# Patient Record
Sex: MALE | Race: WHITE | Employment: FULL TIME | URBAN - METROPOLITAN AREA
[De-identification: names, ages, dates, MRNs, and addresses within clinical notes are randomized per-mention and may not be internally consistent; named-entity substitution may affect disease eponyms.]

---

## 2017-02-02 ENCOUNTER — OFFICE VISIT (OUTPATIENT)
Dept: FAMILY MEDICINE CLINIC | Age: 47
End: 2017-02-02

## 2017-02-02 VITALS
SYSTOLIC BLOOD PRESSURE: 144 MMHG | BODY MASS INDEX: 36.43 KG/M2 | WEIGHT: 246 LBS | HEIGHT: 69 IN | DIASTOLIC BLOOD PRESSURE: 92 MMHG | HEART RATE: 93 BPM | OXYGEN SATURATION: 96 %

## 2017-02-02 DIAGNOSIS — R07.2 PRECORDIAL PAIN: Primary | ICD-10-CM

## 2017-02-02 DIAGNOSIS — I10 ESSENTIAL HYPERTENSION: ICD-10-CM

## 2017-02-02 DIAGNOSIS — R53.83 FATIGUE, UNSPECIFIED TYPE: ICD-10-CM

## 2017-02-02 LAB
A/G RATIO: 2 (ref 1.1–2.2)
ALBUMIN SERPL-MCNC: 5 G/DL (ref 3.4–5)
ALP BLD-CCNC: 88 U/L (ref 40–129)
ALT SERPL-CCNC: 88 U/L (ref 10–40)
ANION GAP SERPL CALCULATED.3IONS-SCNC: 17 MMOL/L (ref 3–16)
AST SERPL-CCNC: 51 U/L (ref 15–37)
BILIRUB SERPL-MCNC: 0.4 MG/DL (ref 0–1)
BUN BLDV-MCNC: 19 MG/DL (ref 7–20)
CALCIUM SERPL-MCNC: 10.1 MG/DL (ref 8.3–10.6)
CHLORIDE BLD-SCNC: 102 MMOL/L (ref 99–110)
CHOLESTEROL, TOTAL: 245 MG/DL (ref 0–199)
CO2: 27 MMOL/L (ref 21–32)
CREAT SERPL-MCNC: 0.8 MG/DL (ref 0.9–1.3)
GFR AFRICAN AMERICAN: >60
GFR NON-AFRICAN AMERICAN: >60
GLOBULIN: 2.5 G/DL
GLUCOSE BLD-MCNC: 88 MG/DL (ref 70–99)
HDLC SERPL-MCNC: 64 MG/DL (ref 40–60)
LDL CHOLESTEROL CALCULATED: 159 MG/DL
POTASSIUM SERPL-SCNC: 4 MMOL/L (ref 3.5–5.1)
SODIUM BLD-SCNC: 146 MMOL/L (ref 136–145)
TOTAL PROTEIN: 7.5 G/DL (ref 6.4–8.2)
TRIGL SERPL-MCNC: 110 MG/DL (ref 0–150)
TSH REFLEX: 1.89 UIU/ML (ref 0.27–4.2)
VLDLC SERPL CALC-MCNC: 22 MG/DL

## 2017-02-02 PROCEDURE — 99204 OFFICE O/P NEW MOD 45 MIN: CPT | Performed by: FAMILY MEDICINE

## 2017-02-02 PROCEDURE — 93000 ELECTROCARDIOGRAM COMPLETE: CPT | Performed by: FAMILY MEDICINE

## 2017-02-02 PROCEDURE — 36415 COLL VENOUS BLD VENIPUNCTURE: CPT | Performed by: FAMILY MEDICINE

## 2017-02-02 RX ORDER — METOPROLOL SUCCINATE 25 MG/1
25 TABLET, EXTENDED RELEASE ORAL DAILY
Qty: 30 TABLET | Refills: 3 | Status: SHIPPED | OUTPATIENT
Start: 2017-02-02 | End: 2017-06-01 | Stop reason: SDUPTHER

## 2017-02-21 RX ORDER — HYDROXYZINE 50 MG/1
25 TABLET, FILM COATED ORAL EVERY 6 HOURS PRN
Qty: 30 TABLET | Refills: 0 | Status: SHIPPED | OUTPATIENT
Start: 2017-02-21 | End: 2017-03-31 | Stop reason: SDUPTHER

## 2017-02-28 ENCOUNTER — TELEPHONE (OUTPATIENT)
Dept: FAMILY MEDICINE CLINIC | Age: 47
End: 2017-02-28

## 2017-03-03 ENCOUNTER — OFFICE VISIT (OUTPATIENT)
Dept: FAMILY MEDICINE CLINIC | Age: 47
End: 2017-03-03

## 2017-03-03 VITALS
HEIGHT: 69 IN | HEART RATE: 71 BPM | DIASTOLIC BLOOD PRESSURE: 84 MMHG | BODY MASS INDEX: 36.58 KG/M2 | WEIGHT: 247 LBS | SYSTOLIC BLOOD PRESSURE: 138 MMHG

## 2017-03-03 DIAGNOSIS — R53.83 FATIGUE, UNSPECIFIED TYPE: ICD-10-CM

## 2017-03-03 DIAGNOSIS — I10 ESSENTIAL HYPERTENSION: Primary | ICD-10-CM

## 2017-03-03 DIAGNOSIS — R74.8 ELEVATED LIVER ENZYMES: ICD-10-CM

## 2017-03-03 DIAGNOSIS — F41.9 ANXIETY: ICD-10-CM

## 2017-03-03 PROCEDURE — 99214 OFFICE O/P EST MOD 30 MIN: CPT | Performed by: FAMILY MEDICINE

## 2017-03-03 RX ORDER — PAROXETINE HYDROCHLORIDE 20 MG/1
20 TABLET, FILM COATED ORAL DAILY
Qty: 30 TABLET | Refills: 11 | Status: SHIPPED | OUTPATIENT
Start: 2017-03-03 | End: 2018-03-26 | Stop reason: SDUPTHER

## 2017-03-11 LAB
CATE U INT: ABNORMAL
CREATININE 24 HOUR URINE: 2327 MG/D (ref 1000–2500)
CREATININE URINE: 179 MG/DL
DOPAMINE (G CRT): 238 UG/G CRT (ref 0–250)
DOPAMINE 24 HOUR URINE: 554 UG/D (ref 77–324)
DOPAMINE, (UG/L): 426 UG/L
EPINEPHRINE (G CRT): 6 UG/G CRT (ref 0–20)
EPINEPHRINE 24 HOUR URINE: 14 UG/D (ref 1–7)
EPINEPHRINE, (UG/L): 11 UG/L
HOURS COLLECTED: 24 HR
METANEPHRINE INTREP URINE: ABNORMAL
METANEPHRINE UG/G CRE: 52 UG/G CRT (ref 0–300)
METANEPHRINE, UR-PER VOL: 93 UG/L
METANEPHRINES URINE: 121 UG/D (ref 62–207)
NOREPINEPH (G CRT): 71 UG/G CRT (ref 0–45)
NOREPINEPHRINE 24 HOUR URINE: 165 UG/D (ref 16–71)
NOREPINEPHRINE, (UG/L): 127 UG/L
NORMETANEPHRINE 24 HOUR URINE: 610 UG/D (ref 125–510)
NORMETANEPHRINE, (G/CRT): 262 UG/G CRT (ref 0–400)
NORMETANEPHRINES, NMOL/L: 469 UG/L
URINE TOTAL VOLUME: 1300 ML

## 2017-03-31 ENCOUNTER — OFFICE VISIT (OUTPATIENT)
Dept: FAMILY MEDICINE CLINIC | Age: 47
End: 2017-03-31

## 2017-03-31 VITALS
SYSTOLIC BLOOD PRESSURE: 139 MMHG | HEART RATE: 75 BPM | HEIGHT: 69 IN | WEIGHT: 247 LBS | DIASTOLIC BLOOD PRESSURE: 88 MMHG | RESPIRATION RATE: 20 BRPM | BODY MASS INDEX: 36.58 KG/M2

## 2017-03-31 DIAGNOSIS — I10 ESSENTIAL HYPERTENSION: Primary | ICD-10-CM

## 2017-03-31 DIAGNOSIS — E66.09 NON MORBID OBESITY DUE TO EXCESS CALORIES: ICD-10-CM

## 2017-03-31 DIAGNOSIS — F41.9 ANXIETY: ICD-10-CM

## 2017-03-31 DIAGNOSIS — R74.8 ELEVATED LIVER ENZYMES: ICD-10-CM

## 2017-03-31 PROCEDURE — 99214 OFFICE O/P EST MOD 30 MIN: CPT | Performed by: FAMILY MEDICINE

## 2017-03-31 RX ORDER — HYDROXYZINE 50 MG/1
25 TABLET, FILM COATED ORAL EVERY 6 HOURS PRN
Qty: 30 TABLET | Refills: 0 | Status: SHIPPED | OUTPATIENT
Start: 2017-03-31 | End: 2017-06-01 | Stop reason: SDUPTHER

## 2017-03-31 RX ORDER — HYDROXYZINE 50 MG/1
25 TABLET, FILM COATED ORAL EVERY 6 HOURS PRN
Qty: 30 TABLET | Refills: 0 | Status: SHIPPED | OUTPATIENT
Start: 2017-03-31 | End: 2017-03-31 | Stop reason: SDUPTHER

## 2017-03-31 RX ORDER — HYDROXYZINE 50 MG/1
TABLET, FILM COATED ORAL
Qty: 30 TABLET | Refills: 0 | Status: SHIPPED | OUTPATIENT
Start: 2017-03-31 | End: 2017-09-11

## 2017-03-31 ASSESSMENT — PATIENT HEALTH QUESTIONNAIRE - PHQ9
1. LITTLE INTEREST OR PLEASURE IN DOING THINGS: 0
SUM OF ALL RESPONSES TO PHQ QUESTIONS 1-9: 0
2. FEELING DOWN, DEPRESSED OR HOPELESS: 0
SUM OF ALL RESPONSES TO PHQ9 QUESTIONS 1 & 2: 0

## 2017-04-24 ENCOUNTER — OFFICE VISIT (OUTPATIENT)
Dept: FAMILY MEDICINE CLINIC | Age: 47
End: 2017-04-24

## 2017-04-24 VITALS
BODY MASS INDEX: 37.18 KG/M2 | HEIGHT: 69 IN | SYSTOLIC BLOOD PRESSURE: 124 MMHG | HEART RATE: 75 BPM | WEIGHT: 251 LBS | DIASTOLIC BLOOD PRESSURE: 88 MMHG

## 2017-04-24 DIAGNOSIS — F41.9 ANXIETY: Primary | ICD-10-CM

## 2017-04-24 DIAGNOSIS — I10 ESSENTIAL HYPERTENSION: ICD-10-CM

## 2017-04-24 DIAGNOSIS — R73.03 PREDIABETES: ICD-10-CM

## 2017-04-24 PROCEDURE — 99214 OFFICE O/P EST MOD 30 MIN: CPT | Performed by: FAMILY MEDICINE

## 2017-06-01 DIAGNOSIS — I10 ESSENTIAL HYPERTENSION: ICD-10-CM

## 2017-06-01 DIAGNOSIS — F41.9 ANXIETY: ICD-10-CM

## 2017-06-01 RX ORDER — METOPROLOL SUCCINATE 25 MG/1
TABLET, EXTENDED RELEASE ORAL
Qty: 90 TABLET | Refills: 3 | Status: SHIPPED | OUTPATIENT
Start: 2017-06-01 | End: 2018-07-26 | Stop reason: SDUPTHER

## 2017-06-01 RX ORDER — HYDROXYZINE 50 MG/1
TABLET, FILM COATED ORAL
Qty: 30 TABLET | Refills: 0 | Status: SHIPPED | OUTPATIENT
Start: 2017-06-01 | End: 2017-09-09 | Stop reason: SDUPTHER

## 2017-09-09 DIAGNOSIS — F41.9 ANXIETY: ICD-10-CM

## 2017-09-11 RX ORDER — HYDROXYZINE 50 MG/1
TABLET, FILM COATED ORAL
Qty: 30 TABLET | Refills: 3 | Status: SHIPPED | OUTPATIENT
Start: 2017-09-11 | End: 2018-03-26 | Stop reason: SDUPTHER

## 2018-03-26 DIAGNOSIS — F41.9 ANXIETY: ICD-10-CM

## 2018-03-26 RX ORDER — PAROXETINE HYDROCHLORIDE 20 MG/1
TABLET, FILM COATED ORAL
Qty: 30 TABLET | Refills: 8 | Status: SHIPPED | OUTPATIENT
Start: 2018-03-26 | End: 2019-03-07 | Stop reason: SDUPTHER

## 2018-03-26 RX ORDER — HYDROXYZINE 50 MG/1
TABLET, FILM COATED ORAL
Qty: 30 TABLET | Refills: 2 | Status: SHIPPED | OUTPATIENT
Start: 2018-03-26 | End: 2018-10-10 | Stop reason: SDUPTHER

## 2018-04-24 ENCOUNTER — OFFICE VISIT (OUTPATIENT)
Dept: FAMILY MEDICINE CLINIC | Age: 48
End: 2018-04-24

## 2018-04-24 VITALS
HEIGHT: 69 IN | DIASTOLIC BLOOD PRESSURE: 91 MMHG | SYSTOLIC BLOOD PRESSURE: 130 MMHG | WEIGHT: 235 LBS | HEART RATE: 97 BPM | BODY MASS INDEX: 34.8 KG/M2

## 2018-04-24 DIAGNOSIS — E66.09 NON MORBID OBESITY DUE TO EXCESS CALORIES: Primary | ICD-10-CM

## 2018-04-24 DIAGNOSIS — E11.9 DIABETES MELLITUS, NEW ONSET (HCC): ICD-10-CM

## 2018-04-24 DIAGNOSIS — I10 ESSENTIAL HYPERTENSION: ICD-10-CM

## 2018-04-24 PROBLEM — R73.03 PREDIABETES: Status: RESOLVED | Noted: 2017-04-24 | Resolved: 2018-04-24

## 2018-04-24 LAB
A/G RATIO: 2 (ref 1.1–2.2)
ALBUMIN SERPL-MCNC: 4.6 G/DL (ref 3.4–5)
ALP BLD-CCNC: 205 U/L (ref 40–129)
ALT SERPL-CCNC: 72 U/L (ref 10–40)
ANION GAP SERPL CALCULATED.3IONS-SCNC: 20 MMOL/L (ref 3–16)
AST SERPL-CCNC: <5 U/L (ref 15–37)
BILIRUB SERPL-MCNC: 0.5 MG/DL (ref 0–1)
BILIRUBIN, POC: NEGATIVE
BLOOD URINE, POC: NEGATIVE
BUN BLDV-MCNC: 15 MG/DL (ref 7–20)
CALCIUM SERPL-MCNC: 9.7 MG/DL (ref 8.3–10.6)
CHLORIDE BLD-SCNC: 87 MMOL/L (ref 99–110)
CHOLESTEROL, TOTAL: 340 MG/DL (ref 0–199)
CLARITY, POC: NEGATIVE
CO2: 26 MMOL/L (ref 21–32)
COLOR, POC: NEGATIVE
CREAT SERPL-MCNC: 0.6 MG/DL (ref 0.9–1.3)
CREATININE URINE: 41.8 MG/DL (ref 39–259)
GFR AFRICAN AMERICAN: >60
GFR NON-AFRICAN AMERICAN: >60
GLOBULIN: 2.3 G/DL
GLUCOSE BLD-MCNC: 492 MG/DL (ref 70–99)
GLUCOSE URINE, POC: ABNORMAL
HBA1C MFR BLD: 14 %
HDLC SERPL-MCNC: 32 MG/DL (ref 40–60)
KETONES, POC: NEGATIVE
LDL CHOLESTEROL CALCULATED: ABNORMAL MG/DL
LDL CHOLESTEROL DIRECT: 117 MG/DL
LEUKOCYTE EST, POC: NEGATIVE
MICROALBUMIN UR-MCNC: <1.2 MG/DL
MICROALBUMIN/CREAT UR-RTO: NORMAL MG/G (ref 0–30)
NITRITE, POC: NEGATIVE
PH, POC: 5.5
POTASSIUM SERPL-SCNC: 5.4 MMOL/L (ref 3.5–5.1)
PROTEIN, POC: NEGATIVE
SODIUM BLD-SCNC: 133 MMOL/L (ref 136–145)
SPECIFIC GRAVITY, POC: <=1.005
TOTAL PROTEIN: 6.9 G/DL (ref 6.4–8.2)
TRIGL SERPL-MCNC: 1780 MG/DL (ref 0–150)
UROBILINOGEN, POC: ABNORMAL
VLDLC SERPL CALC-MCNC: ABNORMAL MG/DL

## 2018-04-24 PROCEDURE — G8417 CALC BMI ABV UP PARAM F/U: HCPCS | Performed by: FAMILY MEDICINE

## 2018-04-24 PROCEDURE — 1036F TOBACCO NON-USER: CPT | Performed by: FAMILY MEDICINE

## 2018-04-24 PROCEDURE — 3046F HEMOGLOBIN A1C LEVEL >9.0%: CPT | Performed by: FAMILY MEDICINE

## 2018-04-24 PROCEDURE — 83036 HEMOGLOBIN GLYCOSYLATED A1C: CPT | Performed by: FAMILY MEDICINE

## 2018-04-24 PROCEDURE — G8427 DOCREV CUR MEDS BY ELIG CLIN: HCPCS | Performed by: FAMILY MEDICINE

## 2018-04-24 PROCEDURE — 99215 OFFICE O/P EST HI 40 MIN: CPT | Performed by: FAMILY MEDICINE

## 2018-04-24 PROCEDURE — 81002 URINALYSIS NONAUTO W/O SCOPE: CPT | Performed by: FAMILY MEDICINE

## 2018-04-24 PROCEDURE — 2022F DILAT RTA XM EVC RTNOPTHY: CPT | Performed by: FAMILY MEDICINE

## 2018-04-24 PROCEDURE — 36415 COLL VENOUS BLD VENIPUNCTURE: CPT | Performed by: FAMILY MEDICINE

## 2018-04-24 RX ORDER — GLIPIZIDE 5 MG/1
5 TABLET, FILM COATED, EXTENDED RELEASE ORAL DAILY
Qty: 90 TABLET | Refills: 3 | Status: SHIPPED | OUTPATIENT
Start: 2018-04-24 | End: 2018-09-07

## 2018-04-24 ASSESSMENT — ENCOUNTER SYMPTOMS
VOMITING: 0
EYE PAIN: 0
COUGH: 0
SORE THROAT: 0
SHORTNESS OF BREATH: 0
NAUSEA: 0
RHINORRHEA: 0
DIARRHEA: 0
RESPIRATORY NEGATIVE: 1
CONSTIPATION: 0
COLOR CHANGE: 0
GASTROINTESTINAL NEGATIVE: 1
ABDOMINAL PAIN: 0

## 2018-04-24 ASSESSMENT — PATIENT HEALTH QUESTIONNAIRE - PHQ9
2. FEELING DOWN, DEPRESSED OR HOPELESS: 0
SUM OF ALL RESPONSES TO PHQ9 QUESTIONS 1 & 2: 0
SUM OF ALL RESPONSES TO PHQ QUESTIONS 1-9: 0
1. LITTLE INTEREST OR PLEASURE IN DOING THINGS: 0

## 2018-04-27 LAB
GLUTAMIC ACID DECARB AB: <5 IU/ML (ref 0–5)
INSULIN A: <0.4 U/ML (ref 0–0.4)

## 2018-05-01 ENCOUNTER — TELEPHONE (OUTPATIENT)
Dept: ORTHOPEDIC SURGERY | Age: 48
End: 2018-05-01

## 2018-05-11 ENCOUNTER — TELEPHONE (OUTPATIENT)
Dept: FAMILY MEDICINE CLINIC | Age: 48
End: 2018-05-11

## 2018-05-11 ENCOUNTER — OFFICE VISIT (OUTPATIENT)
Dept: FAMILY MEDICINE CLINIC | Age: 48
End: 2018-05-11

## 2018-05-11 VITALS
DIASTOLIC BLOOD PRESSURE: 89 MMHG | HEIGHT: 69 IN | SYSTOLIC BLOOD PRESSURE: 131 MMHG | BODY MASS INDEX: 34.36 KG/M2 | HEART RATE: 64 BPM | WEIGHT: 232 LBS

## 2018-05-11 DIAGNOSIS — Z23 NEED FOR PROPHYLACTIC VACCINATION AGAINST STREPTOCOCCUS PNEUMONIAE (PNEUMOCOCCUS): ICD-10-CM

## 2018-05-11 DIAGNOSIS — E66.09 NON MORBID OBESITY DUE TO EXCESS CALORIES: Primary | ICD-10-CM

## 2018-05-11 DIAGNOSIS — E11.9 TYPE 2 DIABETES MELLITUS WITHOUT COMPLICATION, WITHOUT LONG-TERM CURRENT USE OF INSULIN (HCC): ICD-10-CM

## 2018-05-11 PROCEDURE — 90471 IMMUNIZATION ADMIN: CPT | Performed by: FAMILY MEDICINE

## 2018-05-11 PROCEDURE — 99214 OFFICE O/P EST MOD 30 MIN: CPT | Performed by: FAMILY MEDICINE

## 2018-05-11 PROCEDURE — 90732 PPSV23 VACC 2 YRS+ SUBQ/IM: CPT | Performed by: FAMILY MEDICINE

## 2018-05-11 PROCEDURE — G8417 CALC BMI ABV UP PARAM F/U: HCPCS | Performed by: FAMILY MEDICINE

## 2018-05-11 PROCEDURE — G8427 DOCREV CUR MEDS BY ELIG CLIN: HCPCS | Performed by: FAMILY MEDICINE

## 2018-05-11 PROCEDURE — 1036F TOBACCO NON-USER: CPT | Performed by: FAMILY MEDICINE

## 2018-05-11 PROCEDURE — 3046F HEMOGLOBIN A1C LEVEL >9.0%: CPT | Performed by: FAMILY MEDICINE

## 2018-05-11 PROCEDURE — 2022F DILAT RTA XM EVC RTNOPTHY: CPT | Performed by: FAMILY MEDICINE

## 2018-05-11 RX ORDER — LANCETS 30 GAUGE
EACH MISCELLANEOUS
Qty: 100 EACH | Refills: 3 | Status: SHIPPED | OUTPATIENT
Start: 2018-05-11 | End: 2021-06-15

## 2018-05-11 RX ORDER — BLOOD PRESSURE TEST KIT
1 KIT MISCELLANEOUS 2 TIMES DAILY
Qty: 1 EACH | Refills: 0 | Status: SHIPPED | OUTPATIENT
Start: 2018-05-11 | End: 2018-06-10 | Stop reason: SDUPTHER

## 2018-05-11 RX ORDER — GLUCOSAMINE HCL/CHONDROITIN SU 500-400 MG
1 CAPSULE ORAL 2 TIMES DAILY
Qty: 100 STRIP | Refills: 3 | Status: SHIPPED | OUTPATIENT
Start: 2018-05-11 | End: 2020-08-31 | Stop reason: SDUPTHER

## 2018-05-11 ASSESSMENT — ENCOUNTER SYMPTOMS
SHORTNESS OF BREATH: 0
DIARRHEA: 0
CONSTIPATION: 0
NAUSEA: 0
ABDOMINAL PAIN: 0
VOMITING: 0
COUGH: 0

## 2018-06-08 ENCOUNTER — OFFICE VISIT (OUTPATIENT)
Dept: FAMILY MEDICINE CLINIC | Age: 48
End: 2018-06-08

## 2018-06-08 VITALS
SYSTOLIC BLOOD PRESSURE: 127 MMHG | BODY MASS INDEX: 34.96 KG/M2 | HEIGHT: 69 IN | WEIGHT: 236 LBS | HEART RATE: 76 BPM | DIASTOLIC BLOOD PRESSURE: 81 MMHG

## 2018-06-08 DIAGNOSIS — E66.09 NON MORBID OBESITY DUE TO EXCESS CALORIES: ICD-10-CM

## 2018-06-08 DIAGNOSIS — E78.1 HYPERTRIGLYCERIDEMIA: ICD-10-CM

## 2018-06-08 DIAGNOSIS — E11.9 TYPE 2 DIABETES MELLITUS WITHOUT COMPLICATION, WITHOUT LONG-TERM CURRENT USE OF INSULIN (HCC): Primary | ICD-10-CM

## 2018-06-08 DIAGNOSIS — I10 ESSENTIAL HYPERTENSION: ICD-10-CM

## 2018-06-08 LAB — HBA1C MFR BLD: 8.5 %

## 2018-06-08 PROCEDURE — G8417 CALC BMI ABV UP PARAM F/U: HCPCS | Performed by: FAMILY MEDICINE

## 2018-06-08 PROCEDURE — 2022F DILAT RTA XM EVC RTNOPTHY: CPT | Performed by: FAMILY MEDICINE

## 2018-06-08 PROCEDURE — G8427 DOCREV CUR MEDS BY ELIG CLIN: HCPCS | Performed by: FAMILY MEDICINE

## 2018-06-08 PROCEDURE — 1036F TOBACCO NON-USER: CPT | Performed by: FAMILY MEDICINE

## 2018-06-08 PROCEDURE — 83036 HEMOGLOBIN GLYCOSYLATED A1C: CPT | Performed by: FAMILY MEDICINE

## 2018-06-08 PROCEDURE — 3045F PR MOST RECENT HEMOGLOBIN A1C LEVEL 7.0-9.0%: CPT | Performed by: FAMILY MEDICINE

## 2018-06-08 PROCEDURE — 99214 OFFICE O/P EST MOD 30 MIN: CPT | Performed by: FAMILY MEDICINE

## 2018-06-08 RX ORDER — ICOSAPENT ETHYL 1000 MG/1
2 CAPSULE ORAL 2 TIMES DAILY
Qty: 120 CAPSULE | Refills: 3 | Status: SHIPPED | OUTPATIENT
Start: 2018-06-08 | End: 2018-06-08

## 2018-06-08 RX ORDER — ICOSAPENT ETHYL 1000 MG/1
2 CAPSULE ORAL 2 TIMES DAILY
Qty: 120 CAPSULE | Refills: 5 | Status: SHIPPED | OUTPATIENT
Start: 2018-06-08 | End: 2018-12-04

## 2018-06-08 ASSESSMENT — ENCOUNTER SYMPTOMS
DIARRHEA: 0
SHORTNESS OF BREATH: 0
GASTROINTESTINAL NEGATIVE: 1
ABDOMINAL PAIN: 0
VOMITING: 0
CONSTIPATION: 0
WHEEZING: 0
COUGH: 0
NAUSEA: 0
RESPIRATORY NEGATIVE: 1

## 2018-06-10 DIAGNOSIS — E11.9 TYPE 2 DIABETES MELLITUS WITHOUT COMPLICATION, WITHOUT LONG-TERM CURRENT USE OF INSULIN (HCC): ICD-10-CM

## 2018-06-11 RX ORDER — ISOPROPYL ALCOHOL 0.75 G/1
SWAB TOPICAL
Qty: 100 EACH | Refills: 3 | Status: SHIPPED | OUTPATIENT
Start: 2018-06-11 | End: 2020-08-31 | Stop reason: SDUPTHER

## 2018-07-26 DIAGNOSIS — I10 ESSENTIAL HYPERTENSION: ICD-10-CM

## 2018-07-26 RX ORDER — METOPROLOL SUCCINATE 25 MG/1
TABLET, EXTENDED RELEASE ORAL
Qty: 90 TABLET | Refills: 3 | Status: SHIPPED | OUTPATIENT
Start: 2018-07-26 | End: 2019-06-07 | Stop reason: SDUPTHER

## 2018-08-29 ENCOUNTER — TELEPHONE (OUTPATIENT)
Dept: FAMILY MEDICINE CLINIC | Age: 48
End: 2018-08-29

## 2018-08-29 NOTE — TELEPHONE ENCOUNTER
Attempted to contact patient on 8/29/2018. (Sept 7)    Result: no answer at the patient's number, no voicemail available. Pre-Visit planning not completed.

## 2018-09-07 ENCOUNTER — OFFICE VISIT (OUTPATIENT)
Dept: FAMILY MEDICINE CLINIC | Age: 48
End: 2018-09-07

## 2018-09-07 VITALS
WEIGHT: 247 LBS | HEIGHT: 69 IN | HEART RATE: 82 BPM | BODY MASS INDEX: 36.58 KG/M2 | DIASTOLIC BLOOD PRESSURE: 86 MMHG | SYSTOLIC BLOOD PRESSURE: 134 MMHG

## 2018-09-07 DIAGNOSIS — Z23 NEED FOR VACCINATION: ICD-10-CM

## 2018-09-07 DIAGNOSIS — F41.9 ANXIETY: ICD-10-CM

## 2018-09-07 DIAGNOSIS — I10 ESSENTIAL HYPERTENSION: ICD-10-CM

## 2018-09-07 DIAGNOSIS — E11.9 TYPE 2 DIABETES MELLITUS WITHOUT COMPLICATION, WITHOUT LONG-TERM CURRENT USE OF INSULIN (HCC): Primary | ICD-10-CM

## 2018-09-07 DIAGNOSIS — E78.1 HYPERTRIGLYCERIDEMIA: ICD-10-CM

## 2018-09-07 DIAGNOSIS — E66.09 NON MORBID OBESITY DUE TO EXCESS CALORIES: ICD-10-CM

## 2018-09-07 LAB
A/G RATIO: 2 (ref 1.1–2.2)
ALBUMIN SERPL-MCNC: 4.9 G/DL (ref 3.4–5)
ALP BLD-CCNC: 85 U/L (ref 40–129)
ALT SERPL-CCNC: 42 U/L (ref 10–40)
ANION GAP SERPL CALCULATED.3IONS-SCNC: 15 MMOL/L (ref 3–16)
AST SERPL-CCNC: 45 U/L (ref 15–37)
BILIRUB SERPL-MCNC: 0.3 MG/DL (ref 0–1)
BUN BLDV-MCNC: 19 MG/DL (ref 7–20)
CALCIUM SERPL-MCNC: 10.1 MG/DL (ref 8.3–10.6)
CHLORIDE BLD-SCNC: 101 MMOL/L (ref 99–110)
CHOLESTEROL, TOTAL: 235 MG/DL (ref 0–199)
CO2: 27 MMOL/L (ref 21–32)
CREAT SERPL-MCNC: 0.9 MG/DL (ref 0.9–1.3)
GFR AFRICAN AMERICAN: >60
GFR NON-AFRICAN AMERICAN: >60
GLOBULIN: 2.4 G/DL
GLUCOSE BLD-MCNC: 146 MG/DL (ref 70–99)
HBA1C MFR BLD: 5.9 %
HDLC SERPL-MCNC: 50 MG/DL (ref 40–60)
LDL CHOLESTEROL CALCULATED: 134 MG/DL
POTASSIUM SERPL-SCNC: 3.8 MMOL/L (ref 3.5–5.1)
SODIUM BLD-SCNC: 143 MMOL/L (ref 136–145)
TOTAL PROTEIN: 7.3 G/DL (ref 6.4–8.2)
TRIGL SERPL-MCNC: 254 MG/DL (ref 0–150)
VLDLC SERPL CALC-MCNC: 51 MG/DL

## 2018-09-07 PROCEDURE — 99215 OFFICE O/P EST HI 40 MIN: CPT | Performed by: FAMILY MEDICINE

## 2018-09-07 PROCEDURE — 83036 HEMOGLOBIN GLYCOSYLATED A1C: CPT | Performed by: FAMILY MEDICINE

## 2018-09-07 PROCEDURE — 1036F TOBACCO NON-USER: CPT | Performed by: FAMILY MEDICINE

## 2018-09-07 PROCEDURE — 2022F DILAT RTA XM EVC RTNOPTHY: CPT | Performed by: FAMILY MEDICINE

## 2018-09-07 PROCEDURE — G8427 DOCREV CUR MEDS BY ELIG CLIN: HCPCS | Performed by: FAMILY MEDICINE

## 2018-09-07 PROCEDURE — 3044F HG A1C LEVEL LT 7.0%: CPT | Performed by: FAMILY MEDICINE

## 2018-09-07 PROCEDURE — 36415 COLL VENOUS BLD VENIPUNCTURE: CPT | Performed by: FAMILY MEDICINE

## 2018-09-07 PROCEDURE — G8417 CALC BMI ABV UP PARAM F/U: HCPCS | Performed by: FAMILY MEDICINE

## 2018-09-07 ASSESSMENT — ENCOUNTER SYMPTOMS
VOMITING: 0
SHORTNESS OF BREATH: 0
COUGH: 0
DIARRHEA: 0
NAUSEA: 0
COLOR CHANGE: 0
RHINORRHEA: 0
CONSTIPATION: 0
EYE PAIN: 0
SORE THROAT: 0
ABDOMINAL PAIN: 0

## 2018-09-07 NOTE — PROGRESS NOTES
Chief Complaint   Patient presents with    Diabetes    Hyperlipidemia    Hypertension    Obesity         HPI:  Denny Zabala is a 57 y.o. (: 1970) here today   for review of multiple medical problems. He is compliant with his diabetes medications  without diaphoresis, , sweating, , anxiety, , tremor, , diarrhea,  and hypoglycemia   He is  Checking blood sugars   and which are running 105-180. He has cut out his sugary drinks and his BS control is dramatically improved. He is compliant with his cholesterol medication without myalgia and abdominal pain      He is compliant with his blood pressure medications  without Lightheadedness, Urinary Frequency, Edema and Sedation  He is not checking Home Blood Pressures. Obesity:  Patient cites that he has cut out all liquid carb's from his diet and trying to be more mindful of what he eats. He reports his anxiety is much improved and taking and tolerating the Paxil. Review of Systems   Constitutional: Negative for chills and fever. HENT: Negative for ear pain, rhinorrhea and sore throat. Eyes: Negative for pain and visual disturbance. Respiratory: Negative for cough and shortness of breath. Cardiovascular: Negative for chest pain and palpitations. Gastrointestinal: Negative for abdominal pain, constipation, diarrhea, nausea and vomiting. Genitourinary: Negative for dysuria and frequency. Musculoskeletal: Negative for joint swelling and myalgias. Skin: Negative for color change and rash. Neurological: Negative for weakness, numbness and headaches. Hematological: Negative for adenopathy. Does not bruise/bleed easily. Psychiatric/Behavioral: Negative for dysphoric mood, self-injury and suicidal ideas. The patient is not nervous/anxious.         Past Medical History:   Diagnosis Date    Anxiety 3/3/2017    Hypertension     Hypertriglyceridemia 2018    [facility]  normal.   Skin: Skin is warm and dry. No rash noted. No erythema. Psychiatric: He has a normal mood and affect. His speech is normal and behavior is normal.           Hemoglobin A1C (%)   Date Value   06/08/2018 8.5 (A)     Microalbumin, Random Urine (mg/dL)   Date Value   04/24/2018 <1.20     LDL Calculated (mg/dL)   Date Value   04/24/2018 see below       ASSESSMENT/PLAN:    1. Type 2 diabetes mellitus without complication, without long-term current use of insulin ([facility])  a1c was 5.9 today. Dramatically improved. Will dc glipizide and recheck 3 months. With a1c continue other meds. - Comprehensive Metabolic Panel  - POCT glycosylated hemoglobin (Hb A1C)    2. Hypertriglyceridemia  Will check lipids with dietary interventions and meds. Recheck 3 motnhs. - Lipid Panel  - Comprehensive Metabolic Panel    3. Non morbid obesity due to excess calories  Worse: pateitn is muscular though, will follow. 4. Essential hypertension  Controlled: Appears stable. We will continue current management and monitor for adverse reaction and disease progression. Follow-up as noted below    - Comprehensive Metabolic Panel    5. Need for vaccination  Declined flu shot. 6. Anxiety  Stable on meds, continue and follow. RTC 3 months. Scribe attestation:  Irene Henriquez am scribing for and in the presence of Karissa Merlos MD. Electronically signed by Honey Waters on 9/7/2018 at 8:13 AM            Provider attestation: Tisha Holliday MD, personally performed the services scribed by the user listed above in my presence, and it is both accurate and complete. I agree with the ROS and Past Histories independently gathered by the clinical support staff and the remaining scribed note accurately describes my personal service to the patient.     [facility]    9/7/2018  8:37 AM

## 2018-10-10 DIAGNOSIS — F41.9 ANXIETY: ICD-10-CM

## 2018-10-10 DIAGNOSIS — E11.9 DIABETES MELLITUS, NEW ONSET (HCC): ICD-10-CM

## 2018-10-10 RX ORDER — EMPAGLIFLOZIN 25 MG/1
TABLET, FILM COATED ORAL
Qty: 90 TABLET | Refills: 3 | Status: SHIPPED | OUTPATIENT
Start: 2018-10-10 | End: 2019-10-13 | Stop reason: SDUPTHER

## 2018-10-10 RX ORDER — HYDROXYZINE 50 MG/1
TABLET, FILM COATED ORAL
Qty: 30 TABLET | Refills: 1 | Status: SHIPPED | OUTPATIENT
Start: 2018-10-10 | End: 2019-01-03 | Stop reason: SDUPTHER

## 2018-12-04 ENCOUNTER — TELEPHONE (OUTPATIENT)
Dept: FAMILY MEDICINE CLINIC | Age: 48
End: 2018-12-04

## 2019-01-03 DIAGNOSIS — F41.9 ANXIETY: ICD-10-CM

## 2019-01-03 RX ORDER — HYDROXYZINE 50 MG/1
TABLET, FILM COATED ORAL
Qty: 30 TABLET | Refills: 0 | Status: SHIPPED | OUTPATIENT
Start: 2019-01-03 | End: 2019-02-04 | Stop reason: SDUPTHER

## 2019-01-04 ENCOUNTER — OFFICE VISIT (OUTPATIENT)
Dept: PRIMARY CARE CLINIC | Age: 49
End: 2019-01-04
Payer: COMMERCIAL

## 2019-01-04 VITALS
SYSTOLIC BLOOD PRESSURE: 148 MMHG | OXYGEN SATURATION: 93 % | WEIGHT: 245 LBS | BODY MASS INDEX: 36.29 KG/M2 | DIASTOLIC BLOOD PRESSURE: 88 MMHG | HEART RATE: 97 BPM | HEIGHT: 69 IN

## 2019-01-04 DIAGNOSIS — E78.1 HYPERTRIGLYCERIDEMIA: ICD-10-CM

## 2019-01-04 DIAGNOSIS — E78.00 HYPERCHOLESTEROLEMIA: ICD-10-CM

## 2019-01-04 DIAGNOSIS — E11.9 TYPE 2 DIABETES MELLITUS WITHOUT COMPLICATION, WITHOUT LONG-TERM CURRENT USE OF INSULIN (HCC): Primary | ICD-10-CM

## 2019-01-04 DIAGNOSIS — F41.9 ANXIETY: ICD-10-CM

## 2019-01-04 DIAGNOSIS — E66.09 NON MORBID OBESITY DUE TO EXCESS CALORIES: ICD-10-CM

## 2019-01-04 DIAGNOSIS — I10 ESSENTIAL HYPERTENSION: ICD-10-CM

## 2019-01-04 LAB
A/G RATIO: 1.7 (ref 1.1–2.2)
ALBUMIN SERPL-MCNC: 4.7 G/DL (ref 3.4–5)
ALP BLD-CCNC: 99 U/L (ref 40–129)
ALT SERPL-CCNC: 45 U/L (ref 10–40)
ANION GAP SERPL CALCULATED.3IONS-SCNC: 14 MMOL/L (ref 3–16)
AST SERPL-CCNC: 33 U/L (ref 15–37)
BILIRUB SERPL-MCNC: 0.3 MG/DL (ref 0–1)
BUN BLDV-MCNC: 20 MG/DL (ref 7–20)
CALCIUM SERPL-MCNC: 10.4 MG/DL (ref 8.3–10.6)
CHLORIDE BLD-SCNC: 100 MMOL/L (ref 99–110)
CHOLESTEROL, TOTAL: 291 MG/DL (ref 0–199)
CO2: 31 MMOL/L (ref 21–32)
CREAT SERPL-MCNC: 0.8 MG/DL (ref 0.9–1.3)
GFR AFRICAN AMERICAN: >60
GFR NON-AFRICAN AMERICAN: >60
GLOBULIN: 2.8 G/DL
GLUCOSE BLD-MCNC: 123 MG/DL (ref 70–99)
HBA1C MFR BLD: 6.4 %
HDLC SERPL-MCNC: 44 MG/DL (ref 40–60)
LDL CHOLESTEROL CALCULATED: ABNORMAL MG/DL
LDL CHOLESTEROL DIRECT: 207 MG/DL
POTASSIUM SERPL-SCNC: 4 MMOL/L (ref 3.5–5.1)
SODIUM BLD-SCNC: 145 MMOL/L (ref 136–145)
TOTAL PROTEIN: 7.5 G/DL (ref 6.4–8.2)
TRIGL SERPL-MCNC: 441 MG/DL (ref 0–150)
VLDLC SERPL CALC-MCNC: ABNORMAL MG/DL

## 2019-01-04 PROCEDURE — 99214 OFFICE O/P EST MOD 30 MIN: CPT | Performed by: FAMILY MEDICINE

## 2019-01-04 PROCEDURE — 1036F TOBACCO NON-USER: CPT | Performed by: FAMILY MEDICINE

## 2019-01-04 PROCEDURE — G8484 FLU IMMUNIZE NO ADMIN: HCPCS | Performed by: FAMILY MEDICINE

## 2019-01-04 PROCEDURE — G8427 DOCREV CUR MEDS BY ELIG CLIN: HCPCS | Performed by: FAMILY MEDICINE

## 2019-01-04 PROCEDURE — 83036 HEMOGLOBIN GLYCOSYLATED A1C: CPT | Performed by: FAMILY MEDICINE

## 2019-01-04 PROCEDURE — G8417 CALC BMI ABV UP PARAM F/U: HCPCS | Performed by: FAMILY MEDICINE

## 2019-01-04 PROCEDURE — 2022F DILAT RTA XM EVC RTNOPTHY: CPT | Performed by: FAMILY MEDICINE

## 2019-01-04 PROCEDURE — 3046F HEMOGLOBIN A1C LEVEL >9.0%: CPT | Performed by: FAMILY MEDICINE

## 2019-01-04 RX ORDER — ATORVASTATIN CALCIUM 40 MG/1
40 TABLET, FILM COATED ORAL DAILY
Qty: 30 TABLET | Refills: 5 | Status: SHIPPED | OUTPATIENT
Start: 2019-01-04 | End: 2019-06-03 | Stop reason: SDUPTHER

## 2019-01-04 RX ORDER — ATORVASTATIN CALCIUM 20 MG/1
20 TABLET, FILM COATED ORAL DAILY
Qty: 30 TABLET | Refills: 11 | Status: SHIPPED | OUTPATIENT
Start: 2019-01-04 | End: 2019-01-04

## 2019-01-04 RX ORDER — HYDROCHLOROTHIAZIDE 12.5 MG/1
12.5 CAPSULE, GELATIN COATED ORAL DAILY
Qty: 30 CAPSULE | Refills: 11 | Status: SHIPPED | OUTPATIENT
Start: 2019-01-04 | End: 2019-10-13 | Stop reason: SDUPTHER

## 2019-01-04 ASSESSMENT — ENCOUNTER SYMPTOMS
DIARRHEA: 0
COUGH: 0
RHINORRHEA: 0
CONSTIPATION: 0
ABDOMINAL PAIN: 0
VOMITING: 0
NAUSEA: 0
EYE PAIN: 0
COLOR CHANGE: 0
SORE THROAT: 0
SHORTNESS OF BREATH: 0

## 2019-02-04 DIAGNOSIS — F41.9 ANXIETY: ICD-10-CM

## 2019-02-05 RX ORDER — HYDROXYZINE 50 MG/1
TABLET, FILM COATED ORAL
Qty: 30 TABLET | Refills: 0 | Status: SHIPPED | OUTPATIENT
Start: 2019-02-05 | End: 2019-05-12 | Stop reason: SDUPTHER

## 2019-03-07 DIAGNOSIS — F41.9 ANXIETY: ICD-10-CM

## 2019-03-08 RX ORDER — PAROXETINE HYDROCHLORIDE 20 MG/1
TABLET, FILM COATED ORAL
Qty: 30 TABLET | Refills: 7 | Status: SHIPPED | OUTPATIENT
Start: 2019-03-08 | End: 2019-09-28 | Stop reason: SDUPTHER

## 2019-03-22 ENCOUNTER — TELEPHONE (OUTPATIENT)
Dept: PRIMARY CARE CLINIC | Age: 49
End: 2019-03-22

## 2019-03-22 DIAGNOSIS — E11.9 TYPE 2 DIABETES MELLITUS WITHOUT COMPLICATION, WITHOUT LONG-TERM CURRENT USE OF INSULIN (HCC): Primary | ICD-10-CM

## 2019-04-05 ENCOUNTER — OFFICE VISIT (OUTPATIENT)
Dept: PRIMARY CARE CLINIC | Age: 49
End: 2019-04-05
Payer: COMMERCIAL

## 2019-04-05 ENCOUNTER — TELEPHONE (OUTPATIENT)
Dept: INTERNAL MEDICINE CLINIC | Age: 49
End: 2019-04-05

## 2019-04-05 VITALS
BODY MASS INDEX: 37.51 KG/M2 | WEIGHT: 254 LBS | OXYGEN SATURATION: 95 % | TEMPERATURE: 98.2 F | HEART RATE: 72 BPM | SYSTOLIC BLOOD PRESSURE: 129 MMHG | DIASTOLIC BLOOD PRESSURE: 85 MMHG

## 2019-04-05 DIAGNOSIS — E11.9 TYPE 2 DIABETES MELLITUS WITHOUT COMPLICATION, WITHOUT LONG-TERM CURRENT USE OF INSULIN (HCC): ICD-10-CM

## 2019-04-05 DIAGNOSIS — I10 ESSENTIAL HYPERTENSION: ICD-10-CM

## 2019-04-05 DIAGNOSIS — F41.9 ANXIETY: ICD-10-CM

## 2019-04-05 DIAGNOSIS — E78.00 HYPERCHOLESTEROLEMIA: ICD-10-CM

## 2019-04-05 DIAGNOSIS — E66.09 NON MORBID OBESITY DUE TO EXCESS CALORIES: ICD-10-CM

## 2019-04-05 DIAGNOSIS — E11.9 TYPE 2 DIABETES MELLITUS WITHOUT COMPLICATION, WITHOUT LONG-TERM CURRENT USE OF INSULIN (HCC): Primary | ICD-10-CM

## 2019-04-05 LAB
A/G RATIO: 1.7 (ref 1.1–2.2)
ALBUMIN SERPL-MCNC: 4.7 G/DL (ref 3.4–5)
ALP BLD-CCNC: 123 U/L (ref 40–129)
ALT SERPL-CCNC: 44 U/L (ref 10–40)
ANION GAP SERPL CALCULATED.3IONS-SCNC: 18 MMOL/L (ref 3–16)
AST SERPL-CCNC: 33 U/L (ref 15–37)
BILIRUB SERPL-MCNC: 0.4 MG/DL (ref 0–1)
BUN BLDV-MCNC: 20 MG/DL (ref 7–20)
CALCIUM SERPL-MCNC: 9.5 MG/DL (ref 8.3–10.6)
CHLORIDE BLD-SCNC: 97 MMOL/L (ref 99–110)
CHOLESTEROL, TOTAL: 202 MG/DL (ref 0–199)
CO2: 27 MMOL/L (ref 21–32)
CREAT SERPL-MCNC: 0.8 MG/DL (ref 0.9–1.3)
CREATININE URINE: 56.9 MG/DL (ref 39–259)
GFR AFRICAN AMERICAN: >60
GFR NON-AFRICAN AMERICAN: >60
GLOBULIN: 2.7 G/DL
GLUCOSE BLD-MCNC: 212 MG/DL (ref 70–99)
HDLC SERPL-MCNC: 44 MG/DL (ref 40–60)
LDL CHOLESTEROL CALCULATED: ABNORMAL MG/DL
LDL CHOLESTEROL DIRECT: 102 MG/DL
MICROALBUMIN UR-MCNC: <1.2 MG/DL
MICROALBUMIN/CREAT UR-RTO: NORMAL MG/G (ref 0–30)
POTASSIUM SERPL-SCNC: 3.7 MMOL/L (ref 3.5–5.1)
SODIUM BLD-SCNC: 142 MMOL/L (ref 136–145)
TOTAL PROTEIN: 7.4 G/DL (ref 6.4–8.2)
TRIGL SERPL-MCNC: 480 MG/DL (ref 0–150)
VLDLC SERPL CALC-MCNC: ABNORMAL MG/DL

## 2019-04-05 PROCEDURE — 3044F HG A1C LEVEL LT 7.0%: CPT | Performed by: FAMILY MEDICINE

## 2019-04-05 PROCEDURE — 1036F TOBACCO NON-USER: CPT | Performed by: FAMILY MEDICINE

## 2019-04-05 PROCEDURE — 2022F DILAT RTA XM EVC RTNOPTHY: CPT | Performed by: FAMILY MEDICINE

## 2019-04-05 PROCEDURE — G8417 CALC BMI ABV UP PARAM F/U: HCPCS | Performed by: FAMILY MEDICINE

## 2019-04-05 PROCEDURE — 99215 OFFICE O/P EST HI 40 MIN: CPT | Performed by: FAMILY MEDICINE

## 2019-04-05 PROCEDURE — G8427 DOCREV CUR MEDS BY ELIG CLIN: HCPCS | Performed by: FAMILY MEDICINE

## 2019-04-05 RX ORDER — ICOSAPENT ETHYL 1000 MG/1
2 CAPSULE ORAL 2 TIMES DAILY
Qty: 120 CAPSULE | Refills: 5 | Status: SHIPPED
Start: 2019-04-05 | End: 2019-07-15 | Stop reason: CLARIF

## 2019-04-05 ASSESSMENT — ENCOUNTER SYMPTOMS
CONSTIPATION: 0
COUGH: 0
NAUSEA: 0
RHINORRHEA: 0
VOMITING: 0
DIARRHEA: 0
ABDOMINAL PAIN: 0
COLOR CHANGE: 0
SORE THROAT: 0
SHORTNESS OF BREATH: 0
EYE PAIN: 0

## 2019-04-05 ASSESSMENT — PATIENT HEALTH QUESTIONNAIRE - PHQ9
SUM OF ALL RESPONSES TO PHQ QUESTIONS 1-9: 0
SUM OF ALL RESPONSES TO PHQ9 QUESTIONS 1 & 2: 0
1. LITTLE INTEREST OR PLEASURE IN DOING THINGS: 0
SUM OF ALL RESPONSES TO PHQ QUESTIONS 1-9: 0
2. FEELING DOWN, DEPRESSED OR HOPELESS: 0

## 2019-04-05 NOTE — PROGRESS NOTES
weakness, numbness and headaches. Hematological: Negative for adenopathy. Does not bruise/bleed easily. Psychiatric/Behavioral: Negative for dysphoric mood, self-injury and suicidal ideas. The patient is not nervous/anxious. No Known Allergies  New Prescriptions    ICOSAPENT ETHYL (VASCEPA) 1 G CAPS CAPSULE    Take 2 capsules by mouth 2 times daily BIN# [id number]  PCN# CN  GRP# [id number]  ID# [id number]       Meds Prior to visit:  Current Outpatient Medications on File Prior to Visit   Medication Sig Dispense Refill    PARoxetine (PAXIL) 20 MG tablet TAKE ONE TABLET BY MOUTH DAILY 30 tablet 7    hydrOXYzine (ATARAX) 50 MG tablet TAKE ONE-HALF TABLET BY MOUTH EVERY 6 HOURS AS NEEDED FOR ANXIETY 30 tablet 0    hydrochlorothiazide (MICROZIDE) 12.5 MG capsule Take 1 capsule by mouth daily 30 capsule 11    atorvastatin (LIPITOR) 40 MG tablet Take 1 tablet by mouth daily 30 tablet 5    JARDIANCE 25 MG tablet TAKE ONE TABLET BY MOUTH DAILY 90 tablet 3    metFORMIN (GLUCOPHAGE) 1000 MG tablet TAKE ONE TABLET BY MOUTH TWICE A DAY WITH MEALS 180 tablet 3    metoprolol succinate (TOPROL XL) 25 MG extended release tablet TAKE ONE TABLET BY MOUTH DAILY 90 tablet 3    Alcohol Swabs (B-D SINGLE USE SWABS REGULAR) PADS USE TWO TIMES A  each 3    Blood Glucose Monitoring Suppl ROSANA 1 kit by Does not apply route 2 times daily Check blood sugar twice daily as needed Dx:E11.9 1 Device 0    Glucose Blood (BLOOD GLUCOSE TEST STRIPS) STRP 1 each by In Vitro route 2 times daily Accu Check Smart View Glucose Strips   Dx:E11.9 100 strip 3    Lancets MISC Use twice a day with checking blood sugar 100 each 3     No current facility-administered medications on file prior to visit.         Past Medical History:   Diagnosis Date    Anxiety 3/3/2017    Hypercholesterolemia 1/4/2019    Hypertension     Hypertriglyceridemia 6/8/2018    Type 2 diabetes mellitus without complication ([facility] 2/11/7280     Past Surgical History:   Procedure Laterality Date    APPENDECTOMY  2016    Dr. Ana Cristina Snell     Family History   Problem Relation Age of Onset    Heart Disease Father     High Blood Pressure Father     Heart Attack Father 47        Fatal    Heart Disease Brother     High Blood Pressure Brother     Hypertension Mother      Social History     Tobacco Use    Smoking status: Former Smoker     Packs/day: 1.00     Years: 30.00     Pack years: 30.00     Types: Cigarettes    Smokeless tobacco: Never Used    Tobacco comment: quit 6 months ago   Substance Use Topics    Alcohol use: No    Drug use: No       Objective   /85 (Site: Left Upper Arm, Position: Sitting, Cuff Size: Large Adult)   Pulse 72   Temp 98.2 °F (36.8 °C) (Oral)   Wt 254 lb (115.2 kg)   SpO2 95%   BMI 37.51 kg/m²   Wt Readings from Last 3 Encounters:   04/05/19 254 lb (115.2 kg)   01/04/19 245 lb (111.1 kg)   09/07/18 247 lb (112 kg)       Physical Exam   Constitutional: He appears well-developed and well-nourished. obese   HENT:   Head: Normocephalic and atraumatic. Nose: Nose normal.   Mouth/Throat: No oropharyngeal exudate. TMs negative bilaterally, canals patent, nasal mucosa pink and patent,  Oropharynx pink narrow airway   Eyes: Pupils are equal, round, and reactive to light. Right eye exhibits no discharge. Left eye exhibits no discharge. No scleral icterus. Neck: Normal range of motion. Neck supple. No thyromegaly present. Cardiovascular: Normal rate, regular rhythm, normal heart sounds and intact distal pulses. Exam reveals no gallop and no friction rub. No murmur heard. Pulses:       Dorsalis pedis pulses are 2+ on the right side, and 2+ on the left side. Posterior tibial pulses are 2+ on the right side, and 2+ on the left side. No Edema Lower Extremities   Pulmonary/Chest: Effort normal and breath sounds normal. He has no wheezes. He has no rales. Abdominal: Soft. Bowel sounds are normal. He exhibits no distension.  There is no splenomegaly or hepatomegaly. There is no tenderness. There is no rebound and no guarding. Musculoskeletal: Normal range of motion. He exhibits no tenderness or deformity. Lymphadenopathy:     He has no cervical adenopathy. Neurological: He is alert. He has normal strength. No cranial nerve deficit or sensory deficit. Gait normal.   Skin: Skin is warm and dry. No rash noted. No erythema. Psychiatric: He has a normal mood and affect. His speech is normal and behavior is normal.   Vitals reviewed. Chemistry        Component Value Date/Time     01/04/2019 0914    K 4.0 01/04/2019 0914     01/04/2019 0914    CO2 31 01/04/2019 0914    BUN 20 01/04/2019 0914    CREATININE 0.8 (L) 01/04/2019 0914        Component Value Date/Time    CALCIUM 10.4 01/04/2019 0914    ALKPHOS 99 01/04/2019 0914    AST 33 01/04/2019 0914    ALT 45 (H) 01/04/2019 0914    BILITOT 0.3 01/04/2019 0914          Lab Results   Component Value Date    WBC 9.6 07/29/2016    HGB 14.5 07/29/2016    HCT 42.6 07/29/2016    MCV 93.6 07/29/2016     07/29/2016     Lab Results   Component Value Date    LABA1C 6.4 01/04/2019     No results found for: EAG  Lab Results   Component Value Date    LABA1C 6.4 01/04/2019     No components found for: CHLPL  Lab Results   Component Value Date    TRIG 441 (H) 01/04/2019    TRIG 254 (H) 09/07/2018    TRIG 1,780 (H) 04/24/2018     Lab Results   Component Value Date    HDL 44 01/04/2019    HDL 50 09/07/2018    HDL 32 (L) 04/24/2018     Lab Results   Component Value Date    LDLCALC see below 01/04/2019    LDLCALC 134 (H) 09/07/2018    LDLCALC see below 04/24/2018     Lab Results   Component Value Date    LABVLDL see below 01/04/2019    LABVLDL 51 09/07/2018    LABVLDL see below 04/24/2018         Assessment   Plan     1. Type 2 diabetes mellitus without complication, without long-term current use of insulin (HCC)  Controlled: Appears stable.   We will continue current management and monitor for adverse reaction and disease progression. Follow-up as noted below    - Comprehensive Metabolic Panel; Future    2. Hypercholesterolemia  Will check response to statin ttx and rechheck 3 months. Start Vascepa for cardioprotection.   - Lipid Panel; Future  - Comprehensive Metabolic Panel; Future  - Icosapent Ethyl (VASCEPA) 1 g CAPS capsule; Take 2 capsules by mouth 2 times daily BIN# [id number]  PCN# CN  GRP# [id number]  ID# [id number]  Dispense: 120 capsule; Refill: 5    3. Essential hypertension  Controlled: Appears stable. We will continue current management and monitor for adverse reaction and disease progression. Follow-up as noted below    - Comprehensive Metabolic Panel; Future    4. Non morbid obesity due to excess calories  Worse, counseled on diet (Pizza ? !?)    5. Anxiety  Controlled: Appears stable. We will continue current management and monitor for adverse reaction and disease progression. Follow-up as noted below        Siobhan Sotelo received counseling on the following healthy behaviors: nutrition and exercise    Patient given educational materials on Nutrition and Exercise    Discussed use, benefit, and side effects of prescribed medications. Barriers to medication compliance addressed. All patient questions answered. Pt voiced understanding. RTC 3 months    Scribe attestation:  Bhargav Escobar, am scribing for and in the presence of Laura Fisehr MD. Electronically signed by Ronan Pollock MA on 4/5/2019 at 8:18 AM          Provider attestation: Balwinder Jones MD, personally performed the services scribed by the user listed above in my presence, and it is both accurate and complete. I agree with the ROS and Past Histories independently gathered by the clinical support staff and the remaining scribed note accurately describes my personal service to the patient.     [facility]    4/5/2019  8:55 AM

## 2019-04-05 NOTE — PATIENT INSTRUCTIONS
Wt Readings from Last 3 Encounters:   04/05/19 254 lb (115.2 kg)   01/04/19 245 lb (111.1 kg)   09/07/18 247 lb (112 kg)       BP Readings from Last 3 Encounters:   04/05/19 129/85   01/04/19 (!) 148/88   09/07/18 134/86       Here are some recommendations for weight loss:  Check into The GI Diet or \"Primal diet\"    Take your desired weight in pounds and multiply by 10 and that is your average daily calorie allowance. For example if you wish to weigh 170 lb x 10 = 1700 blanquita/day (this is how to gradually lose the weight and maintain your desired weight). Avoid soda/coke and all \"wet carbs\" => Drink ice water instead    Drink a large glass of ice water before meals and EAT SLOWLY (talk while you eat)! Rethink your hunger => it means your losing weight. Minimize carbohydrates as they stimulate your appetite.   Avoid Bread, Rice, Pasta and Potatoes      Avoid eating calories after 6 pm    Consider getting [employer] for your phone  Consider getting a FitBit to track your activity

## 2019-05-12 DIAGNOSIS — F41.9 ANXIETY: ICD-10-CM

## 2019-05-13 ENCOUNTER — TELEPHONE (OUTPATIENT)
Dept: INTERNAL MEDICINE CLINIC | Age: 49
End: 2019-05-13

## 2019-05-13 RX ORDER — HYDROXYZINE 50 MG/1
TABLET, FILM COATED ORAL
Qty: 30 TABLET | Refills: 0 | Status: SHIPPED | OUTPATIENT
Start: 2019-05-13 | End: 2019-06-07 | Stop reason: SDUPTHER

## 2019-05-13 NOTE — TELEPHONE ENCOUNTER
Requested Prescriptions     Pending Prescriptions Disp Refills    hydrOXYzine (ATARAX) 50 MG tablet [Pharmacy Med Name: hydrOXYzine HCL 50 MG TABLET] 30 tablet 0     Sig: TAKE ONE-HALF TABLET BY MOUTH EVERY 6 HOURS AS NEEDED FOR ANXIETY     [facility]  Last  Office visit 4/5/2019  Next office visit   7/15/2019

## 2019-06-03 DIAGNOSIS — E78.00 HYPERCHOLESTEROLEMIA: ICD-10-CM

## 2019-06-04 RX ORDER — ATORVASTATIN CALCIUM 40 MG/1
TABLET, FILM COATED ORAL
Qty: 30 TABLET | Refills: 4 | Status: SHIPPED | OUTPATIENT
Start: 2019-06-04 | End: 2019-11-15 | Stop reason: SDUPTHER

## 2019-06-07 DIAGNOSIS — I10 ESSENTIAL HYPERTENSION: ICD-10-CM

## 2019-06-07 DIAGNOSIS — F41.9 ANXIETY: ICD-10-CM

## 2019-06-07 RX ORDER — HYDROXYZINE 50 MG/1
TABLET, FILM COATED ORAL
Qty: 30 TABLET | Refills: 0 | Status: SHIPPED | OUTPATIENT
Start: 2019-06-07 | End: 2019-07-13 | Stop reason: SDUPTHER

## 2019-06-07 RX ORDER — METOPROLOL SUCCINATE 25 MG/1
TABLET, EXTENDED RELEASE ORAL
Qty: 90 TABLET | Refills: 2 | Status: SHIPPED | OUTPATIENT
Start: 2019-06-07 | End: 2020-03-18

## 2019-07-01 ENCOUNTER — TELEPHONE (OUTPATIENT)
Dept: PRIMARY CARE CLINIC | Age: 49
End: 2019-07-01

## 2019-07-01 NOTE — TELEPHONE ENCOUNTER
Attempted to contact patient on 7/1/2019. Result: no answer at the patient's number, no voicemail available. Pre-Visit planning not completed.            Krissy Kay  Patient Services Specialist  [phone]

## 2019-07-08 NOTE — TELEPHONE ENCOUNTER
Attempted to contact patient on 7/8/2019. Result: no answer at the patient's number, no voicemail available. Pre-Visit planning not completed.            Néstor Rm  Patient Services Specialist  [phone]

## 2019-07-13 DIAGNOSIS — F41.9 ANXIETY: ICD-10-CM

## 2019-07-15 ENCOUNTER — OFFICE VISIT (OUTPATIENT)
Dept: PRIMARY CARE CLINIC | Age: 49
End: 2019-07-15
Payer: COMMERCIAL

## 2019-07-15 VITALS
HEART RATE: 69 BPM | DIASTOLIC BLOOD PRESSURE: 84 MMHG | SYSTOLIC BLOOD PRESSURE: 125 MMHG | HEIGHT: 69 IN | WEIGHT: 246 LBS | BODY MASS INDEX: 36.43 KG/M2

## 2019-07-15 DIAGNOSIS — E66.09 NON MORBID OBESITY DUE TO EXCESS CALORIES: ICD-10-CM

## 2019-07-15 DIAGNOSIS — F41.9 ANXIETY: ICD-10-CM

## 2019-07-15 DIAGNOSIS — E78.00 HYPERCHOLESTEROLEMIA: ICD-10-CM

## 2019-07-15 DIAGNOSIS — I10 ESSENTIAL HYPERTENSION: Primary | ICD-10-CM

## 2019-07-15 DIAGNOSIS — E11.9 TYPE 2 DIABETES MELLITUS WITHOUT COMPLICATION, WITHOUT LONG-TERM CURRENT USE OF INSULIN (HCC): ICD-10-CM

## 2019-07-15 LAB — HBA1C MFR BLD: 7.4 %

## 2019-07-15 PROCEDURE — 2022F DILAT RTA XM EVC RTNOPTHY: CPT | Performed by: FAMILY MEDICINE

## 2019-07-15 PROCEDURE — 1036F TOBACCO NON-USER: CPT | Performed by: FAMILY MEDICINE

## 2019-07-15 PROCEDURE — 99215 OFFICE O/P EST HI 40 MIN: CPT | Performed by: FAMILY MEDICINE

## 2019-07-15 PROCEDURE — G8417 CALC BMI ABV UP PARAM F/U: HCPCS | Performed by: FAMILY MEDICINE

## 2019-07-15 PROCEDURE — G8427 DOCREV CUR MEDS BY ELIG CLIN: HCPCS | Performed by: FAMILY MEDICINE

## 2019-07-15 PROCEDURE — 3045F PR MOST RECENT HEMOGLOBIN A1C LEVEL 7.0-9.0%: CPT | Performed by: FAMILY MEDICINE

## 2019-07-15 PROCEDURE — 83036 HEMOGLOBIN GLYCOSYLATED A1C: CPT | Performed by: FAMILY MEDICINE

## 2019-07-15 RX ORDER — HYDROXYZINE 50 MG/1
TABLET, FILM COATED ORAL
Qty: 30 TABLET | Refills: 0 | Status: SHIPPED | OUTPATIENT
Start: 2019-07-15 | End: 2019-08-11 | Stop reason: SDUPTHER

## 2019-07-15 ASSESSMENT — PATIENT HEALTH QUESTIONNAIRE - PHQ9
1. LITTLE INTEREST OR PLEASURE IN DOING THINGS: 0
SUM OF ALL RESPONSES TO PHQ9 QUESTIONS 1 & 2: 0
SUM OF ALL RESPONSES TO PHQ QUESTIONS 1-9: 0
2. FEELING DOWN, DEPRESSED OR HOPELESS: 0
SUM OF ALL RESPONSES TO PHQ QUESTIONS 1-9: 0

## 2019-07-15 ASSESSMENT — ENCOUNTER SYMPTOMS
NAUSEA: 0
RHINORRHEA: 0
DIARRHEA: 0
COUGH: 0
EYE PAIN: 0
VOMITING: 0
COLOR CHANGE: 0
CONSTIPATION: 0
SORE THROAT: 0
SHORTNESS OF BREATH: 0
ABDOMINAL PAIN: 0

## 2019-08-11 DIAGNOSIS — F41.9 ANXIETY: ICD-10-CM

## 2019-08-11 DIAGNOSIS — E11.9 DIABETES MELLITUS, NEW ONSET (HCC): ICD-10-CM

## 2019-08-12 RX ORDER — HYDROXYZINE 50 MG/1
TABLET, FILM COATED ORAL
Qty: 30 TABLET | Refills: 0 | Status: SHIPPED | OUTPATIENT
Start: 2019-08-12 | End: 2019-10-13 | Stop reason: SDUPTHER

## 2019-09-28 DIAGNOSIS — F41.9 ANXIETY: ICD-10-CM

## 2019-09-30 ENCOUNTER — TELEPHONE (OUTPATIENT)
Dept: PRIMARY CARE CLINIC | Age: 49
End: 2019-09-30

## 2019-09-30 RX ORDER — PAROXETINE HYDROCHLORIDE 20 MG/1
TABLET, FILM COATED ORAL
Qty: 60 TABLET | Refills: 5 | Status: SHIPPED | OUTPATIENT
Start: 2019-09-30 | End: 2020-06-08 | Stop reason: SDUPTHER

## 2019-10-13 DIAGNOSIS — I10 ESSENTIAL HYPERTENSION: ICD-10-CM

## 2019-10-13 DIAGNOSIS — F41.9 ANXIETY: ICD-10-CM

## 2019-10-13 DIAGNOSIS — E11.9 DIABETES MELLITUS, NEW ONSET (HCC): ICD-10-CM

## 2019-10-14 ENCOUNTER — OFFICE VISIT (OUTPATIENT)
Dept: PRIMARY CARE CLINIC | Age: 49
End: 2019-10-14
Payer: COMMERCIAL

## 2019-10-14 VITALS
BODY MASS INDEX: 36.73 KG/M2 | HEART RATE: 68 BPM | SYSTOLIC BLOOD PRESSURE: 113 MMHG | HEIGHT: 69 IN | DIASTOLIC BLOOD PRESSURE: 71 MMHG | WEIGHT: 248 LBS

## 2019-10-14 DIAGNOSIS — Z23 NEED FOR VACCINATION: ICD-10-CM

## 2019-10-14 DIAGNOSIS — I10 ESSENTIAL HYPERTENSION: ICD-10-CM

## 2019-10-14 DIAGNOSIS — F41.9 ANXIETY: ICD-10-CM

## 2019-10-14 DIAGNOSIS — E11.9 TYPE 2 DIABETES MELLITUS WITHOUT COMPLICATION, WITHOUT LONG-TERM CURRENT USE OF INSULIN (HCC): ICD-10-CM

## 2019-10-14 DIAGNOSIS — E78.00 HYPERCHOLESTEROLEMIA: ICD-10-CM

## 2019-10-14 DIAGNOSIS — E11.9 TYPE 2 DIABETES MELLITUS WITHOUT COMPLICATION, WITHOUT LONG-TERM CURRENT USE OF INSULIN (HCC): Primary | ICD-10-CM

## 2019-10-14 DIAGNOSIS — E66.09 NON MORBID OBESITY DUE TO EXCESS CALORIES: ICD-10-CM

## 2019-10-14 LAB
A/G RATIO: 3 (ref 1.1–2.2)
ALBUMIN SERPL-MCNC: 5.4 G/DL (ref 3.4–5)
ALP BLD-CCNC: 91 U/L (ref 40–129)
ALT SERPL-CCNC: <5 U/L (ref 10–40)
ANION GAP SERPL CALCULATED.3IONS-SCNC: 14 MMOL/L (ref 3–16)
AST SERPL-CCNC: <5 U/L (ref 15–37)
BILIRUB SERPL-MCNC: 0.3 MG/DL (ref 0–1)
BUN BLDV-MCNC: 23 MG/DL (ref 7–20)
CALCIUM SERPL-MCNC: 9.6 MG/DL (ref 8.3–10.6)
CHLORIDE BLD-SCNC: 98 MMOL/L (ref 99–110)
CO2: 27 MMOL/L (ref 21–32)
CREAT SERPL-MCNC: 0.7 MG/DL (ref 0.9–1.3)
GFR AFRICAN AMERICAN: >60
GFR NON-AFRICAN AMERICAN: >60
GLOBULIN: 1.8 G/DL
GLUCOSE BLD-MCNC: 116 MG/DL (ref 70–99)
HBA1C MFR BLD: 6.5 %
POTASSIUM SERPL-SCNC: 3.8 MMOL/L (ref 3.5–5.1)
SODIUM BLD-SCNC: 139 MMOL/L (ref 136–145)
TOTAL PROTEIN: 7.2 G/DL (ref 6.4–8.2)

## 2019-10-14 PROCEDURE — G8482 FLU IMMUNIZE ORDER/ADMIN: HCPCS | Performed by: FAMILY MEDICINE

## 2019-10-14 PROCEDURE — G8417 CALC BMI ABV UP PARAM F/U: HCPCS | Performed by: FAMILY MEDICINE

## 2019-10-14 PROCEDURE — 90471 IMMUNIZATION ADMIN: CPT | Performed by: FAMILY MEDICINE

## 2019-10-14 PROCEDURE — 99215 OFFICE O/P EST HI 40 MIN: CPT | Performed by: FAMILY MEDICINE

## 2019-10-14 PROCEDURE — 1036F TOBACCO NON-USER: CPT | Performed by: FAMILY MEDICINE

## 2019-10-14 PROCEDURE — 83036 HEMOGLOBIN GLYCOSYLATED A1C: CPT | Performed by: FAMILY MEDICINE

## 2019-10-14 PROCEDURE — 90472 IMMUNIZATION ADMIN EACH ADD: CPT | Performed by: FAMILY MEDICINE

## 2019-10-14 PROCEDURE — G8427 DOCREV CUR MEDS BY ELIG CLIN: HCPCS | Performed by: FAMILY MEDICINE

## 2019-10-14 PROCEDURE — 90715 TDAP VACCINE 7 YRS/> IM: CPT | Performed by: FAMILY MEDICINE

## 2019-10-14 PROCEDURE — 90686 IIV4 VACC NO PRSV 0.5 ML IM: CPT | Performed by: FAMILY MEDICINE

## 2019-10-14 PROCEDURE — 2022F DILAT RTA XM EVC RTNOPTHY: CPT | Performed by: FAMILY MEDICINE

## 2019-10-14 RX ORDER — HYDROCHLOROTHIAZIDE 12.5 MG/1
CAPSULE, GELATIN COATED ORAL
Qty: 90 CAPSULE | Refills: 3 | Status: SHIPPED | OUTPATIENT
Start: 2019-10-14 | End: 2020-08-31 | Stop reason: SDUPTHER

## 2019-10-14 RX ORDER — HYDROXYZINE 50 MG/1
TABLET, FILM COATED ORAL
Qty: 90 TABLET | Refills: 3 | Status: SHIPPED | OUTPATIENT
Start: 2019-10-14 | End: 2020-08-31 | Stop reason: SDUPTHER

## 2019-10-14 RX ORDER — EMPAGLIFLOZIN 25 MG/1
TABLET, FILM COATED ORAL
Qty: 90 TABLET | Refills: 3 | Status: SHIPPED | OUTPATIENT
Start: 2019-10-14 | End: 2020-08-31 | Stop reason: SDUPTHER

## 2019-10-14 ASSESSMENT — ENCOUNTER SYMPTOMS
ABDOMINAL PAIN: 0
COUGH: 0
EYE PAIN: 0
NAUSEA: 0
SORE THROAT: 0
COLOR CHANGE: 0
SHORTNESS OF BREATH: 0
CONSTIPATION: 0
RHINORRHEA: 0
DIARRHEA: 0
VOMITING: 0

## 2019-11-15 DIAGNOSIS — E78.00 HYPERCHOLESTEROLEMIA: ICD-10-CM

## 2019-11-18 RX ORDER — ATORVASTATIN CALCIUM 40 MG/1
TABLET, FILM COATED ORAL
Qty: 30 TABLET | Refills: 3 | Status: SHIPPED | OUTPATIENT
Start: 2019-11-18 | End: 2020-03-18

## 2020-03-18 RX ORDER — METOPROLOL SUCCINATE 25 MG/1
TABLET, EXTENDED RELEASE ORAL
Qty: 90 TABLET | Refills: 1 | Status: SHIPPED | OUTPATIENT
Start: 2020-03-18 | End: 2020-08-31 | Stop reason: SDUPTHER

## 2020-03-18 RX ORDER — ATORVASTATIN CALCIUM 40 MG/1
TABLET, FILM COATED ORAL
Qty: 90 TABLET | Refills: 1 | Status: SHIPPED | OUTPATIENT
Start: 2020-03-18 | End: 2020-08-31 | Stop reason: SDUPTHER

## 2020-06-08 ENCOUNTER — OFFICE VISIT (OUTPATIENT)
Dept: PRIMARY CARE CLINIC | Age: 50
End: 2020-06-08
Payer: COMMERCIAL

## 2020-06-08 VITALS
DIASTOLIC BLOOD PRESSURE: 83 MMHG | BODY MASS INDEX: 35.4 KG/M2 | HEART RATE: 73 BPM | HEIGHT: 69 IN | SYSTOLIC BLOOD PRESSURE: 134 MMHG | WEIGHT: 239 LBS

## 2020-06-08 DIAGNOSIS — E78.1 HYPERTRIGLYCERIDEMIA: ICD-10-CM

## 2020-06-08 DIAGNOSIS — E11.9 TYPE 2 DIABETES MELLITUS WITHOUT COMPLICATION, WITHOUT LONG-TERM CURRENT USE OF INSULIN (HCC): ICD-10-CM

## 2020-06-08 DIAGNOSIS — I10 ESSENTIAL HYPERTENSION: ICD-10-CM

## 2020-06-08 LAB
A/G RATIO: 2 (ref 1.1–2.2)
ALBUMIN SERPL-MCNC: 4.3 G/DL (ref 3.4–5)
ALP BLD-CCNC: 101 U/L (ref 40–129)
ALT SERPL-CCNC: 32 U/L (ref 10–40)
ANION GAP SERPL CALCULATED.3IONS-SCNC: 12 MMOL/L (ref 3–16)
AST SERPL-CCNC: 32 U/L (ref 15–37)
BILIRUB SERPL-MCNC: <0.2 MG/DL (ref 0–1)
BUN BLDV-MCNC: 22 MG/DL (ref 7–20)
CALCIUM SERPL-MCNC: 9 MG/DL (ref 8.3–10.6)
CHLORIDE BLD-SCNC: 103 MMOL/L (ref 99–110)
CHOLESTEROL, TOTAL: 142 MG/DL (ref 0–199)
CO2: 27 MMOL/L (ref 21–32)
CREAT SERPL-MCNC: 0.8 MG/DL (ref 0.9–1.3)
CREATININE URINE: 60.7 MG/DL (ref 39–259)
GFR AFRICAN AMERICAN: >60
GFR NON-AFRICAN AMERICAN: >60
GLOBULIN: 2.1 G/DL
GLUCOSE BLD-MCNC: 174 MG/DL (ref 70–99)
HBA1C MFR BLD: 6.1 %
HDLC SERPL-MCNC: 46 MG/DL (ref 40–60)
LDL CHOLESTEROL CALCULATED: 49 MG/DL
MICROALBUMIN UR-MCNC: <1.2 MG/DL
MICROALBUMIN/CREAT UR-RTO: NORMAL MG/G (ref 0–30)
POTASSIUM SERPL-SCNC: 3.6 MMOL/L (ref 3.5–5.1)
SODIUM BLD-SCNC: 142 MMOL/L (ref 136–145)
TOTAL PROTEIN: 6.4 G/DL (ref 6.4–8.2)
TRIGL SERPL-MCNC: 233 MG/DL (ref 0–150)
VLDLC SERPL CALC-MCNC: 47 MG/DL

## 2020-06-08 PROCEDURE — G8417 CALC BMI ABV UP PARAM F/U: HCPCS | Performed by: FAMILY MEDICINE

## 2020-06-08 PROCEDURE — 83036 HEMOGLOBIN GLYCOSYLATED A1C: CPT | Performed by: FAMILY MEDICINE

## 2020-06-08 PROCEDURE — 99215 OFFICE O/P EST HI 40 MIN: CPT | Performed by: FAMILY MEDICINE

## 2020-06-08 PROCEDURE — 2022F DILAT RTA XM EVC RTNOPTHY: CPT | Performed by: FAMILY MEDICINE

## 2020-06-08 PROCEDURE — 3046F HEMOGLOBIN A1C LEVEL >9.0%: CPT | Performed by: FAMILY MEDICINE

## 2020-06-08 PROCEDURE — G8427 DOCREV CUR MEDS BY ELIG CLIN: HCPCS | Performed by: FAMILY MEDICINE

## 2020-06-08 PROCEDURE — 1036F TOBACCO NON-USER: CPT | Performed by: FAMILY MEDICINE

## 2020-06-08 RX ORDER — PAROXETINE HYDROCHLORIDE 20 MG/1
TABLET, FILM COATED ORAL
Qty: 90 TABLET | Refills: 3 | Status: SHIPPED | OUTPATIENT
Start: 2020-06-08 | End: 2020-08-31 | Stop reason: SDUPTHER

## 2020-06-08 ASSESSMENT — ENCOUNTER SYMPTOMS
ABDOMINAL PAIN: 0
VOMITING: 0
EYE PAIN: 0
CONSTIPATION: 0
NAUSEA: 0
COUGH: 0
COLOR CHANGE: 0
SHORTNESS OF BREATH: 0
RHINORRHEA: 0
DIARRHEA: 0
SORE THROAT: 0

## 2020-06-08 ASSESSMENT — PATIENT HEALTH QUESTIONNAIRE - PHQ9
SUM OF ALL RESPONSES TO PHQ9 QUESTIONS 1 & 2: 0
SUM OF ALL RESPONSES TO PHQ QUESTIONS 1-9: 0
SUM OF ALL RESPONSES TO PHQ QUESTIONS 1-9: 0
1. LITTLE INTEREST OR PLEASURE IN DOING THINGS: 0
2. FEELING DOWN, DEPRESSED OR HOPELESS: 0

## 2020-06-08 NOTE — PROGRESS NOTES
(MICROZIDE) 12.5 MG capsule TAKE ONE CAPSULE BY MOUTH DAILY 90 capsule 3    JARDIANCE 25 MG tablet TAKE ONE TABLET BY MOUTH DAILY 90 tablet 3    hydrOXYzine (ATARAX) 50 MG tablet TAKE ONE-HALF TABLET BY MOUTH EVERY 6 HOURS AS NEEDED FOR ANXIETY 90 tablet 3    PARoxetine (PAXIL) 20 MG tablet TAKE ONE TABLET BY MOUTH DAILY 60 tablet 5    metFORMIN (GLUCOPHAGE) 1000 MG tablet TAKE ONE TABLET BY MOUTH TWICE A DAY WITH MEALS 180 tablet 2    Alcohol Swabs (B-D SINGLE USE SWABS REGULAR) PADS USE TWO TIMES A  each 3    Blood Glucose Monitoring Suppl ROSANA 1 kit by Does not apply route 2 times daily Check blood sugar twice daily as needed Dx:E11.9 1 Device 0    Glucose Blood (BLOOD GLUCOSE TEST STRIPS) STRP 1 each by In Vitro route 2 times daily Accu Check Smart View Glucose Strips   Dx:E11.9 100 strip 3    Lancets MISC Use twice a day with checking blood sugar 100 each 3     No current facility-administered medications for this visit.         Past Medical History:   Diagnosis Date    Anxiety 3/3/2017    Hypercholesterolemia 1/4/2019    Hypertension     Hypertriglyceridemia 6/8/2018    [facility] 4/41/8450     Past Surgical History:   Procedure Laterality Date    APPENDECTOMY  2016    Dr. Dick Cowart     Family History   Problem Relation Age of Onset    Heart Disease Father     High Blood Pressure Father     Heart Attack Father 47        Fatal    Heart Disease Brother     High Blood Pressure Brother     Hypertension Mother      Social History     Tobacco Use    Smoking status: Former Smoker     Packs/day: 1.00     Years: 30.00     Pack years: 30.00     Types: Cigarettes    Smokeless tobacco: Never Used    Tobacco comment: quit 6 months ago   Substance Use Topics    Alcohol use: No    Drug use: No       Objective   /83   Pulse 73   Ht 5' 9\" (1.753 m)   Wt 239 lb (108.4 kg)   BMI 35.29 kg/m²   Wt Readings from Last 3 Encounters:   06/08/20 239 lb

## 2020-06-08 NOTE — PATIENT INSTRUCTIONS
Examine your lifestyle and the barriers to bad and good habits and how you can design your life to make better choices    If you want to feel better these are the FUNDAMENTAL PILLARS of Wellness:    Make it EASY to do the RIGHT THINGS. 1)  You can choose to Get 150 min/week of moderate exercise (can talk but can't sing) or 75 min/week of vigorous exercise (can't talk)   This will enhance your sense of well being (Exercise is as good as medicine for depression.)    2)  You can choose to Get 7-9 hours of sleep per night    Detoxifies your brain, reduces risk of dementia    3)  You can choose to Strength Train 2 x a week on non-consecutive days   This will improve function and reduce risk of injury. Body weight type exercises such as Yoga and Pilates are good    4)  You can choose good nutrition. Only eat your goal weight (in lbs) x 10 calories/day and get 5 servings of Vegetables/day   Plant based diets reduce risk of heart attack/stroke and will help you feel full on less food. Avoid highly processed foods and processed carbohydrates. 5)  You can choose moderate alcohol intake < 1-2 drinks/day   Alcohol will disrupt your sleep and add calories to your day    6)  You can choose to develop a Charismatic/Supportive relationship. This will strengthen your resilience for the ups and downs. 7)  You can choose to Practice Mindfulness. An hour a day of prayer/meditation/gratitude will change your life! Here are some recommendations for weight loss:  Check into The GI Diet or \"Primal diet\", Intermittent fasting. Take your desired weight in pounds and multiply by 10 and that is your average daily calorie allowance. For example if you wish to weigh 170 lb x 10 = 1700 blanquita/day (this is how to gradually lose the weight and maintain your desired weight).     Avoid soda/coke and all \"wet carbs\" => Drink ice water instead    Drink a large glass of ice water before meals and EAT SLOWLY (talk while you eat)!    Rethink your hunger => it means your losing weight. Minimize carbohydrates as they stimulate your appetite. Avoid Bread, Rice, Pasta and Potatoes      Avoid eating calories after 6 pm  Try taking your calories only in 6 hours of the day - fast the rest of the day      Just How Much of a Benefit Do We Get from a Healthful Lifestyle? Moy Watson MD reviewing Volodymyr Maria et al. BMJ 2020 Jan 8  A long-term analysis suggests that adopting such a lifestyle at midlife might add as long as 10 years of disease-free life. Virtually everyone knows that a healthful lifestyle -- never smoking, normal body-mass index (BMI), moderate-to-vigorous physical activity, moderate alcohol intake, and a higher-quality diet -- is good for their health. What very few people know is just how much benefit they get from achieving all these lifestyle goals. A [place] team examined data from about 111,000 people at age 48 and followed them prospectively for as long as 34 years. Healthful lifestyle factors were measured repeatedly and systematically, and development of various diseases and death were recorded. The primary endpoint was life expectancy free from diabetes, cardiovascular diseases, and cancer. Women who met all the healthful lifestyle measures had an additional 10.7 years of disease-free life compared with women who met no healthful lifestyle measures. For men, the number was 7.6 additional disease-free years.

## 2020-08-28 PROBLEM — E66.01 MORBIDLY OBESE (HCC): Status: ACTIVE | Noted: 2020-08-28

## 2020-08-31 ENCOUNTER — OFFICE VISIT (OUTPATIENT)
Dept: PRIMARY CARE CLINIC | Age: 50
End: 2020-08-31
Payer: COMMERCIAL

## 2020-08-31 VITALS
HEART RATE: 66 BPM | HEIGHT: 69 IN | WEIGHT: 240.1 LBS | TEMPERATURE: 97.3 F | DIASTOLIC BLOOD PRESSURE: 67 MMHG | BODY MASS INDEX: 35.56 KG/M2 | SYSTOLIC BLOOD PRESSURE: 102 MMHG

## 2020-08-31 PROCEDURE — G8427 DOCREV CUR MEDS BY ELIG CLIN: HCPCS | Performed by: FAMILY MEDICINE

## 2020-08-31 PROCEDURE — 1036F TOBACCO NON-USER: CPT | Performed by: FAMILY MEDICINE

## 2020-08-31 PROCEDURE — 99214 OFFICE O/P EST MOD 30 MIN: CPT | Performed by: FAMILY MEDICINE

## 2020-08-31 PROCEDURE — 3044F HG A1C LEVEL LT 7.0%: CPT | Performed by: FAMILY MEDICINE

## 2020-08-31 PROCEDURE — 2022F DILAT RTA XM EVC RTNOPTHY: CPT | Performed by: FAMILY MEDICINE

## 2020-08-31 PROCEDURE — G8417 CALC BMI ABV UP PARAM F/U: HCPCS | Performed by: FAMILY MEDICINE

## 2020-08-31 RX ORDER — METOPROLOL SUCCINATE 25 MG/1
25 TABLET, EXTENDED RELEASE ORAL DAILY
Qty: 90 TABLET | Refills: 3 | Status: SHIPPED | OUTPATIENT
Start: 2020-08-31 | End: 2021-06-15 | Stop reason: SDUPTHER

## 2020-08-31 RX ORDER — ATORVASTATIN CALCIUM 40 MG/1
40 TABLET, FILM COATED ORAL NIGHTLY
Qty: 90 TABLET | Refills: 1 | Status: SHIPPED | OUTPATIENT
Start: 2020-08-31 | End: 2021-04-12

## 2020-08-31 RX ORDER — PAROXETINE HYDROCHLORIDE 20 MG/1
TABLET, FILM COATED ORAL
Qty: 90 TABLET | Refills: 3 | Status: SHIPPED | OUTPATIENT
Start: 2020-08-31 | End: 2021-06-15 | Stop reason: SDUPTHER

## 2020-08-31 RX ORDER — ISOPROPYL ALCOHOL 0.75 G/1
SWAB TOPICAL
Qty: 100 EACH | Refills: 3 | Status: SHIPPED | OUTPATIENT
Start: 2020-08-31 | End: 2021-06-15

## 2020-08-31 RX ORDER — HYDROCHLOROTHIAZIDE 12.5 MG/1
12.5 CAPSULE, GELATIN COATED ORAL EVERY MORNING
Qty: 90 CAPSULE | Refills: 3 | Status: SHIPPED | OUTPATIENT
Start: 2020-08-31 | End: 2021-06-15 | Stop reason: SDUPTHER

## 2020-08-31 RX ORDER — EMPAGLIFLOZIN 25 MG/1
25 TABLET, FILM COATED ORAL
Qty: 90 TABLET | Refills: 3 | Status: SHIPPED | OUTPATIENT
Start: 2020-08-31 | End: 2021-06-15 | Stop reason: SDUPTHER

## 2020-08-31 RX ORDER — HYDROXYZINE 50 MG/1
TABLET, FILM COATED ORAL
Qty: 90 TABLET | Refills: 3 | Status: SHIPPED | OUTPATIENT
Start: 2020-08-31 | End: 2021-06-15 | Stop reason: SDUPTHER

## 2020-08-31 RX ORDER — GLUCOSAMINE HCL/CHONDROITIN SU 500-400 MG
CAPSULE ORAL
Qty: 100 STRIP | Refills: 1 | Status: SHIPPED | OUTPATIENT
Start: 2020-08-31 | End: 2020-09-01 | Stop reason: SDUPTHER

## 2020-08-31 ASSESSMENT — ENCOUNTER SYMPTOMS
SHORTNESS OF BREATH: 0
VOMITING: 0
ABDOMINAL PAIN: 0
CONSTIPATION: 0
NAUSEA: 0
DIARRHEA: 0
COUGH: 0

## 2020-08-31 NOTE — PROGRESS NOTES
Chief Complaint   Patient presents with    Diabetes    Hypertension    Hyperlipidemia    Obesity    Anxiety           HPI:  Lito Molina is a 52 y.o. (: 1970) here today for multiple medical concerns. He is compliant with his diabetes medications  without diaphoresis , sweating , anxiety , tremor , diarrhea and hypoglycemia  He is  Checking blood sugars infrequently. He is compliant with his blood pressure medications  without Lightheadedness, Urinary Frequency, Edema and Sedation  He is not checking Home Blood Pressures           He is compliant with his cholesterol medication without myalgia and abdominal pain      Patient cites health, increased physical ability, self-image as reasons for wanting to lose weight. Weight loss techniques attempted: self-directed dieting  Comorbidities: diabetes mellitus, dyslipidemias and hypertension          He is compliant with his medication for anxiety. He reports these have been somewhat effective  He is not having side effects of sedation, , sexual dysfunction, , nausea, , weight gain,  and  weight loss. Review of Systems   Constitutional: Negative for chills and fever. Respiratory: Negative for cough and shortness of breath. Cardiovascular: Negative for chest pain and palpitations. Gastrointestinal: Negative for abdominal pain, constipation, diarrhea, nausea and vomiting. Endocrine: Negative for polyuria. Genitourinary: Negative for dysuria.        No Known Allergies  New Prescriptions    No medications on file       Meds Prior to visit:  Current Outpatient Medications on File Prior to Visit   Medication Sig Dispense Refill    PARoxetine (PAXIL) 20 MG tablet TAKE ONE TABLET BY MOUTH DAILY 90 tablet 3    atorvastatin (LIPITOR) 40 MG tablet TAKE ONE TABLET BY MOUTH DAILY 90 tablet 1    metoprolol succinate (TOPROL XL) 25 MG extended release tablet TAKE ONE TABLET BY MOUTH DAILY 90 tablet 1    hydrochlorothiazide (MICROZIDE) 12.5 MG capsule TAKE ONE CAPSULE BY MOUTH DAILY 90 capsule 3    JARDIANCE 25 MG tablet TAKE ONE TABLET BY MOUTH DAILY 90 tablet 3    hydrOXYzine (ATARAX) 50 MG tablet TAKE ONE-HALF TABLET BY MOUTH EVERY 6 HOURS AS NEEDED FOR ANXIETY 90 tablet 3    Alcohol Swabs (B-D SINGLE USE SWABS REGULAR) PADS USE TWO TIMES A  each 3    Blood Glucose Monitoring Suppl ROSANA 1 kit by Does not apply route 2 times daily Check blood sugar twice daily as needed Dx:E11.9 1 Device 0    Glucose Blood (BLOOD GLUCOSE TEST STRIPS) STRP 1 each by In Vitro route 2 times daily Accu Check Smart View Glucose Strips   Dx:E11.9 100 strip 3    Lancets MISC Use twice a day with checking blood sugar 100 each 3     No current facility-administered medications on file prior to visit. Past Medical History:   Diagnosis Date    Anxiety 3/3/2017    Hypercholesterolemia 1/4/2019    Hypertension     Hypertriglyceridemia 6/8/2018    Type 2 diabetes mellitus without complication ([facility] [address] 9/67/1703     Past Surgical History:   Procedure Laterality Date    APPENDECTOMY  2016    Dr. Amish Golden     Family History   Problem Relation Age of Onset    Heart Disease Father     High Blood Pressure Father     Heart Attack Father 47        Fatal    Heart Disease Brother     High Blood Pressure Brother     Hypertension Mother      Social History     Tobacco Use    Smoking status: Former Smoker     Packs/day: 1.00     Years: 30.00     Pack years: 30.00     Types: Cigarettes    Smokeless tobacco: Never Used    Tobacco comment: quit 6 months ago   Substance Use Topics    Alcohol use: No    Drug use: No       Objective   /67   Pulse 66   Temp 97.3 °F (36.3 °C) (Temporal)   Ht 5' 9\" (1.753 m)   Wt 240 lb 1.6 oz (108.9 kg)   BMI 35.46 kg/m²   Wt Readings from Last 3 Encounters:   06/08/20 239 lb (108.4 kg)   10/14/19 248 lb (112.5 kg)   07/15/19 246 lb (111.6 kg)       Physical Exam  Constitutional:       Appearance: He is well-developed.  He is obese.   Cardiovascular:      Rate and Rhythm: Normal rate and regular rhythm. Heart sounds: No murmur. No friction rub. No gallop. Pulmonary:      Effort: Pulmonary effort is normal.      Breath sounds: Normal breath sounds. No wheezing or rales. Abdominal:      General: Bowel sounds are normal. There is no distension. Palpations: Abdomen is soft. There is no mass. Tenderness: There is no abdominal tenderness. Skin:     General: Skin is warm and dry. Findings: No rash. Chemistry        Component Value Date/Time     06/08/2020 0758    K 3.6 06/08/2020 0758     06/08/2020 0758    CO2 27 06/08/2020 0758    BUN 22 (H) 06/08/2020 0758    CREATININE 0.8 (L) 06/08/2020 0758        Component Value Date/Time    CALCIUM 9.0 06/08/2020 0758    ALKPHOS 101 06/08/2020 0758    AST 32 06/08/2020 0758    ALT 32 06/08/2020 0758    BILITOT <0.2 06/08/2020 0758          Lab Results   Component Value Date    WBC 9.6 07/29/2016    HGB 14.5 07/29/2016    HCT 42.6 07/29/2016    MCV 93.6 07/29/2016     07/29/2016     Lab Results   Component Value Date    LABA1C 6.1 06/08/2020     No results found for: EAG  Lab Results   Component Value Date    LABA1C 6.1 06/08/2020     No components found for: CHLPL  Lab Results   Component Value Date    TRIG 233 (H) 06/08/2020    TRIG 480 (H) 04/05/2019    TRIG 441 (H) 01/04/2019     Lab Results   Component Value Date    HDL 46 06/08/2020    HDL 44 04/05/2019    HDL 44 01/04/2019     Lab Results   Component Value Date    LDLCALC 49 06/08/2020    [address] see below 04/05/2019    LDLCALC see below 01/04/2019     Lab Results   Component Value Date    LABVLDL 47 06/08/2020    LABVLDL see below 04/05/2019    LABVLDL see below 01/04/2019         Assessment   Plan     1. Type 2 diabetes mellitus without complication, without long-term current use of insulin (HCC)  Controlled: Appears stable.   We will continue current management and monitor for adverse reaction and disease progression. Follow-up as noted below    - HM DIABETES FOOT EXAM  - Alcohol Swabs (B-D SINGLE USE SWABS REGULAR) PADS  Dispense: 100 each; Refill: 3  - blood glucose monitor kit and supplies; 1 kit 2 times daily Check blood sugar twice daily as needed Dx:E11.9  - blood glucose monitor strips; 2 times daily Accu Check Smart View Glucose Strips   Dx:E11.9    2. Morbidly obese (HCC)  Stable: Counseled on diet and exercise follow-up    3. Essential hypertension  Controlled: Appears stable. We will continue current management and monitor for adverse reaction and disease progression. Follow-up as noted below      - metoprolol succinate (TOPROL XL) 25 MG extended release tablet; Take 1 tablet by mouth daily  Dispense: 90 tablet; Refill: 3  - hydroCHLOROthiazide (MICROZIDE) 12.5 MG capsule  Dispense: 90 capsule; Refill: 3    4. Anxiety  Controlled: Appears stable. We will continue current management and monitor for adverse reaction and disease progression. Follow-up as noted below    - PARoxetine (PAXIL) 20 MG tablet; TAKE ONE TABLET BY MOUTH DAILY  Dispense: 90 tablet; Refill: 3  - hydrOXYzine (ATARAX) 50 MG tablet  Dispense: 90 tablet; Refill: 3    5. Diabetes mellitus, new onset ([address]  No longer  So we will change associations  - empagliflozin (JARDIANCE) 25 MG tablet  Dispense: 90 tablet; Refill: 3    6. Hypercholesterolemia  Controlled: Appears stable. We will continue current management and monitor for adverse reaction and disease progression. Follow-up as noted below    - atorvastatin (LIPITOR) 40 MG tablet; Take 1 tablet by mouth  Dispense: 90 tablet; Refill: 1      Esequiel received counseling on the following healthy behaviors: nutrition and exercise    Discussed use, benefit, and side effects of prescribed medications. Barriers to medication compliance addressed. All patient questions answered. Pt voiced understanding. RTC No follow-ups on file.     Scribe attestation:  MONI Verito Sinclair MA, am scribing for and in the presence of Viktoria Phan MD. Electronically signed by Verito Sinclair MA on 8/31/2020 at 9:12 AM          Provider attestation: Steffany Chowdhury MD  personally performed the services described in this documentation, as scribed by the user listed above in my presence, and it is both accurate and complete. I agree with the Chief Complaint, ROS, and Past Histories independently gathered by the clinical support staff and the remaining scribed note accurately describes my personal service to the patient.     8/31/2020    9:12 AM

## 2020-09-01 RX ORDER — GLUCOSAMINE HCL/CHONDROITIN SU 500-400 MG
CAPSULE ORAL
Qty: 300 STRIP | Refills: 3 | Status: SHIPPED | OUTPATIENT
Start: 2020-09-01 | End: 2021-06-15

## 2020-10-15 ENCOUNTER — TELEPHONE (OUTPATIENT)
Dept: PRIMARY CARE CLINIC | Age: 50
End: 2020-10-15

## 2020-10-15 NOTE — LETTER
[facility]  [address] [place] [facility] [address]  Phone: [phone]  Fax: [phone]    Roxane Braxton MD        October 21, 2020      To Whom it May Concern,      Xochitl Mccarthy ([birth year])  has been advised and agreed to not take hydrOXYzine (ATARAX) medication while driving. Esequiel's A1c on 6/8/2018 was 6.1.  If you have any further questions or concerns please contact the office at [phone]      Sincerely,          Roxane Braxton MD

## 2020-10-15 NOTE — TELEPHONE ENCOUNTER
Pt had a DOT physical.  His company is wanting a letter from his PCP stating that it is ok for him to drive on the medications he is on and they would like to also know his A1C level.       Pt asked we please fax to:  [phone]    Att:  Darron Doshi          Please call pt when complete at [phone]

## 2020-10-15 NOTE — TELEPHONE ENCOUNTER
Please call: Because it causes sedation I would not take it before driving but otherwise yes it is fine

## 2020-10-15 NOTE — TELEPHONE ENCOUNTER
Spoke with patient , he says employer is asking if it is okay to take HhydrOXYzine (ATARAX) 50 MG tablet. He reports he takes 1/2 tablet daily. Patient's last A1c was on 6/8/20 with 6.1 result.  He reports he had a physical at [facility] and was physically cleared.

## 2020-10-21 ENCOUNTER — TELEPHONE (OUTPATIENT)
Dept: PRIMARY CARE CLINIC | Age: 50
End: 2020-10-21

## 2020-10-21 NOTE — TELEPHONE ENCOUNTER
Spoke with patient gave him Dr. Sara Cerna suggestion to not take hydrOXYzine (ATARAX) 50 MG tablet medication while driving.  Will fax note to requested fax

## 2020-12-15 ENCOUNTER — OFFICE VISIT (OUTPATIENT)
Dept: PRIMARY CARE CLINIC | Age: 50
End: 2020-12-15
Payer: COMMERCIAL

## 2020-12-15 VITALS
BODY MASS INDEX: 37.47 KG/M2 | WEIGHT: 253 LBS | HEIGHT: 69 IN | DIASTOLIC BLOOD PRESSURE: 71 MMHG | HEART RATE: 74 BPM | SYSTOLIC BLOOD PRESSURE: 133 MMHG | TEMPERATURE: 96.4 F

## 2020-12-15 DIAGNOSIS — E11.9 TYPE 2 DIABETES MELLITUS WITHOUT COMPLICATION, WITHOUT LONG-TERM CURRENT USE OF INSULIN (HCC): ICD-10-CM

## 2020-12-15 DIAGNOSIS — I10 ESSENTIAL HYPERTENSION: ICD-10-CM

## 2020-12-15 LAB
A/G RATIO: 2.1 (ref 1.1–2.2)
ALBUMIN SERPL-MCNC: 4.6 G/DL (ref 3.4–5)
ALP BLD-CCNC: 107 U/L (ref 40–129)
ALT SERPL-CCNC: 38 U/L (ref 10–40)
ANION GAP SERPL CALCULATED.3IONS-SCNC: 10 MMOL/L (ref 3–16)
AST SERPL-CCNC: 30 U/L (ref 15–37)
BILIRUB SERPL-MCNC: 0.4 MG/DL (ref 0–1)
BUN BLDV-MCNC: 20 MG/DL (ref 7–20)
CALCIUM SERPL-MCNC: 9.5 MG/DL (ref 8.3–10.6)
CHLORIDE BLD-SCNC: 102 MMOL/L (ref 99–110)
CO2: 28 MMOL/L (ref 21–32)
CREAT SERPL-MCNC: 0.7 MG/DL (ref 0.9–1.3)
GFR AFRICAN AMERICAN: >60
GFR NON-AFRICAN AMERICAN: >60
GLOBULIN: 2.2 G/DL
GLUCOSE BLD-MCNC: 154 MG/DL (ref 70–99)
POTASSIUM SERPL-SCNC: 4.1 MMOL/L (ref 3.5–5.1)
SODIUM BLD-SCNC: 140 MMOL/L (ref 136–145)
TOTAL PROTEIN: 6.8 G/DL (ref 6.4–8.2)

## 2020-12-15 PROCEDURE — 1036F TOBACCO NON-USER: CPT | Performed by: FAMILY MEDICINE

## 2020-12-15 PROCEDURE — G8482 FLU IMMUNIZE ORDER/ADMIN: HCPCS | Performed by: FAMILY MEDICINE

## 2020-12-15 PROCEDURE — 3044F HG A1C LEVEL LT 7.0%: CPT | Performed by: FAMILY MEDICINE

## 2020-12-15 PROCEDURE — G8417 CALC BMI ABV UP PARAM F/U: HCPCS | Performed by: FAMILY MEDICINE

## 2020-12-15 PROCEDURE — G8427 DOCREV CUR MEDS BY ELIG CLIN: HCPCS | Performed by: FAMILY MEDICINE

## 2020-12-15 PROCEDURE — 99214 OFFICE O/P EST MOD 30 MIN: CPT | Performed by: FAMILY MEDICINE

## 2020-12-15 PROCEDURE — 2022F DILAT RTA XM EVC RTNOPTHY: CPT | Performed by: FAMILY MEDICINE

## 2020-12-15 ASSESSMENT — PATIENT HEALTH QUESTIONNAIRE - PHQ9
7. TROUBLE CONCENTRATING ON THINGS, SUCH AS READING THE NEWSPAPER OR WATCHING TELEVISION: 0
SUM OF ALL RESPONSES TO PHQ QUESTIONS 1-9: 1
5. POOR APPETITE OR OVEREATING: 0
10. IF YOU CHECKED OFF ANY PROBLEMS, HOW DIFFICULT HAVE THESE PROBLEMS MADE IT FOR YOU TO DO YOUR WORK, TAKE CARE OF THINGS AT HOME, OR GET ALONG WITH OTHER PEOPLE: 0
1. LITTLE INTEREST OR PLEASURE IN DOING THINGS: 0
2. FEELING DOWN, DEPRESSED OR HOPELESS: 0
8. MOVING OR SPEAKING SO SLOWLY THAT OTHER PEOPLE COULD HAVE NOTICED. OR THE OPPOSITE, BEING SO FIGETY OR RESTLESS THAT YOU HAVE BEEN MOVING AROUND A LOT MORE THAN USUAL: 0
4. FEELING TIRED OR HAVING LITTLE ENERGY: 1
9. THOUGHTS THAT YOU WOULD BE BETTER OFF DEAD, OR OF HURTING YOURSELF: 0
SUM OF ALL RESPONSES TO PHQ9 QUESTIONS 1 & 2: 0
6. FEELING BAD ABOUT YOURSELF - OR THAT YOU ARE A FAILURE OR HAVE LET YOURSELF OR YOUR FAMILY DOWN: 0
3. TROUBLE FALLING OR STAYING ASLEEP: 0
SUM OF ALL RESPONSES TO PHQ QUESTIONS 1-9: 1
SUM OF ALL RESPONSES TO PHQ QUESTIONS 1-9: 1

## 2020-12-15 ASSESSMENT — ENCOUNTER SYMPTOMS
NAUSEA: 0
DIARRHEA: 0
CONSTIPATION: 0
ABDOMINAL PAIN: 0
VOMITING: 0
COUGH: 0
SHORTNESS OF BREATH: 0

## 2020-12-15 NOTE — PROGRESS NOTES
MOUTH EVERY 6 HOURS AS NEEDED FOR ANXIETY 90 tablet 3    blood glucose monitor kit and supplies Check blood sugar twice daily as needed Dx:E11.9 1 kit 0    atorvastatin (LIPITOR) 40 MG tablet Take 1 tablet by mouth nightly 90 tablet 1    Lancets MISC Use twice a day with checking blood sugar 100 each 3     No current facility-administered medications for this visit. Past Medical History:   Diagnosis Date    Anxiety 3/3/2017    Hypercholesterolemia 1/4/2019    Hypertension     Hypertriglyceridemia 6/8/2018    Type 2 diabetes mellitus without complication ([facility]) 0/17/1462         Objective   /71   Pulse 74   Temp 96.4 °F (35.8 °C) (Temporal)   Ht 5' 9\" (1.753 m)   Wt 253 lb (114.8 kg)   BMI 37.36 kg/m²   Wt Readings from Last 3 Encounters:   12/15/20 253 lb (114.8 kg)   08/31/20 240 lb 1.6 oz (108.9 kg)   06/08/20 239 lb (108.4 kg)       Physical Exam  Constitutional:       Appearance: He is well-developed. He is obese. Cardiovascular:      Rate and Rhythm: Normal rate and regular rhythm. Heart sounds: No murmur. No friction rub. No gallop. Pulmonary:      Effort: Pulmonary effort is normal.      Breath sounds: Normal breath sounds. No wheezing or rales. Abdominal:      General: Bowel sounds are normal. There is no distension. Palpations: Abdomen is soft. There is no mass. Tenderness: There is no abdominal tenderness. Skin:     General: Skin is warm and dry. Findings: No rash.            Chemistry        Component Value Date/Time     06/08/2020 0758    K 3.6 06/08/2020 0758     06/08/2020 0758    CO2 27 06/08/2020 0758    BUN 22 (H) 06/08/2020 0758    CREATININE 0.8 (L) 06/08/2020 0758        Component Value Date/Time    CALCIUM 9.0 06/08/2020 0758    ALKPHOS 101 06/08/2020 0758    AST 32 06/08/2020 0758    ALT 32 06/08/2020 0758    BILITOT <0.2 06/08/2020 0758          Lab Results   Component Value Date    WBC 9.6 07/29/2016    HGB 14.5 07/29/2016 HCT 42.6 07/29/2016    MCV 93.6 07/29/2016     07/29/2016     Lab Results   Component Value Date    LABA1C 6.1 06/08/2020     No results found for: EAG  Lab Results   Component Value Date    LABA1C 6.1 06/08/2020     No components found for: CHLPL  Lab Results   Component Value Date    TRIG 233 (H) 06/08/2020    TRIG 480 (H) 04/05/2019    TRIG 441 (H) 01/04/2019     Lab Results   Component Value Date    HDL 46 06/08/2020    HDL 44 04/05/2019    HDL 44 01/04/2019     Lab Results   Component Value Date    LDLCALC 49 06/08/2020    [address] see below 04/05/2019    LDLCALC see below 01/04/2019     Lab Results   Component Value Date    LABVLDL 47 06/08/2020    LABVLDL see below 04/05/2019    LABVLDL see below 01/04/2019         Assessment   Plan     1. Type 2 diabetes mellitus without complication, without long-term current use of insulin ([address]  Historically well controlled. We will recheck labs today noting his significant weight gain and dietary indiscretion. Encourage patient to get back on his lifestyle modification  - Comprehensive Metabolic Panel; Future  - Hemoglobin A1C; Future    2. Essential hypertension  Controlled: Appears stable. We will continue current management and monitor for adverse reaction and disease progression. Follow-up as noted below    - Comprehensive Metabolic Panel; Future    3. Morbidly obese (HCC)  Significantly worse. Counseled patient on diet and exercise. 4. Anxiety  Stable: Continue meds and follow    5. Need for vaccination  Patient reports he had flu shot October 19    Donnie Beltran received counseling on the following healthy behaviors: nutrition and exercise    Patient given educational materials on Nutrition and Exercise    t. Discussed use, benefit, and side effects of prescribed medications. Barriers to medication compliance addressed. All patient questions answered. Pt voiced understanding.        Health Maintenance   Topic Date Due    HIV screen  08/06/1987   [facility] Hepatitis B vaccine (1 of 3 - Risk 3-dose series) 08/06/1991    Diabetic retinal exam  01/17/2021    A1C test (Diabetic or Prediabetic)  06/08/2021    Diabetic microalbuminuria test  06/08/2021    Lipid screen  06/08/2021    Potassium monitoring  06/08/2021    Creatinine monitoring  06/08/2021    Diabetic foot exam  08/31/2021    DTaP/Tdap/Td vaccine (2 - Td) 10/14/2029    Flu vaccine  Completed    Pneumococcal 0-64 years Vaccine  Completed    Hepatitis A vaccine  Aged Out    Hib vaccine  Aged Out    Meningococcal (ACWY) vaccine  Aged Out       RTC 3 months and as needed

## 2020-12-15 NOTE — PROGRESS NOTES
PHQ-9 Total Score: 1 (12/15/2020  8:55 AM)  Thoughts that you would be better off dead, or of hurting yourself in some way: 0 (12/15/2020  8:55 AM)

## 2020-12-16 LAB
ESTIMATED AVERAGE GLUCOSE: 168.6 MG/DL
HBA1C MFR BLD: 7.5 %

## 2021-03-15 ENCOUNTER — OFFICE VISIT (OUTPATIENT)
Dept: PRIMARY CARE CLINIC | Age: 51
End: 2021-03-15
Payer: COMMERCIAL

## 2021-03-15 VITALS
SYSTOLIC BLOOD PRESSURE: 132 MMHG | HEART RATE: 78 BPM | DIASTOLIC BLOOD PRESSURE: 84 MMHG | BODY MASS INDEX: 37.74 KG/M2 | WEIGHT: 254.8 LBS | HEIGHT: 69 IN | TEMPERATURE: 96.8 F

## 2021-03-15 DIAGNOSIS — E11.9 TYPE 2 DIABETES MELLITUS WITHOUT COMPLICATION, WITHOUT LONG-TERM CURRENT USE OF INSULIN (HCC): Primary | ICD-10-CM

## 2021-03-15 DIAGNOSIS — I10 ESSENTIAL HYPERTENSION: ICD-10-CM

## 2021-03-15 DIAGNOSIS — F41.9 ANXIETY: ICD-10-CM

## 2021-03-15 DIAGNOSIS — E66.01 MORBIDLY OBESE (HCC): ICD-10-CM

## 2021-03-15 PROCEDURE — G8427 DOCREV CUR MEDS BY ELIG CLIN: HCPCS | Performed by: FAMILY MEDICINE

## 2021-03-15 PROCEDURE — G8482 FLU IMMUNIZE ORDER/ADMIN: HCPCS | Performed by: FAMILY MEDICINE

## 2021-03-15 PROCEDURE — 96127 BRIEF EMOTIONAL/BEHAV ASSMT: CPT | Performed by: FAMILY MEDICINE

## 2021-03-15 PROCEDURE — 99214 OFFICE O/P EST MOD 30 MIN: CPT | Performed by: FAMILY MEDICINE

## 2021-03-15 PROCEDURE — 2022F DILAT RTA XM EVC RTNOPTHY: CPT | Performed by: FAMILY MEDICINE

## 2021-03-15 PROCEDURE — G8417 CALC BMI ABV UP PARAM F/U: HCPCS | Performed by: FAMILY MEDICINE

## 2021-03-15 PROCEDURE — 1036F TOBACCO NON-USER: CPT | Performed by: FAMILY MEDICINE

## 2021-03-15 PROCEDURE — 3046F HEMOGLOBIN A1C LEVEL >9.0%: CPT | Performed by: FAMILY MEDICINE

## 2021-03-15 ASSESSMENT — ANXIETY QUESTIONNAIRES
3. WORRYING TOO MUCH ABOUT DIFFERENT THINGS: 0-NOT AT ALL
7. FEELING AFRAID AS IF SOMETHING AWFUL MIGHT HAPPEN: 0-NOT AT ALL
2. NOT BEING ABLE TO STOP OR CONTROL WORRYING: 0-NOT AT ALL
6. BECOMING EASILY ANNOYED OR IRRITABLE: 0-NOT AT ALL
GAD7 TOTAL SCORE: 0
1. FEELING NERVOUS, ANXIOUS, OR ON EDGE: 0-NOT AT ALL

## 2021-03-15 ASSESSMENT — PATIENT HEALTH QUESTIONNAIRE - PHQ9
SUM OF ALL RESPONSES TO PHQ QUESTIONS 1-9: 0
2. FEELING DOWN, DEPRESSED OR HOPELESS: 0

## 2021-03-15 ASSESSMENT — ENCOUNTER SYMPTOMS
SHORTNESS OF BREATH: 0
ABDOMINAL PAIN: 0
CONSTIPATION: 0
DIARRHEA: 0
NAUSEA: 0
VOMITING: 0
COUGH: 0

## 2021-03-15 NOTE — PROGRESS NOTES
USE SWABS REGULAR) PADS USE TWO TIMES A  each 3    hydrOXYzine (ATARAX) 50 MG tablet TAKE ONE-HALF TABLET BY MOUTH EVERY 6 HOURS AS NEEDED FOR ANXIETY 90 tablet 3    blood glucose monitor kit and supplies Check blood sugar twice daily as needed Dx:E11.9 1 kit 0    atorvastatin (LIPITOR) 40 MG tablet Take 1 tablet by mouth nightly 90 tablet 1    Lancets MISC Use twice a day with checking blood sugar 100 each 3     No current facility-administered medications for this visit. Past Medical History:   Diagnosis Date    Anxiety 3/3/2017    Hypercholesterolemia 1/4/2019    Hypertension     Hypertriglyceridemia 6/8/2018    Type 2 diabetes mellitus without complication (HCC) 4/70/5260         Objective   /84   Pulse 78   Temp 96.8 °F (36 °C) (Temporal)   Ht 5' 9\" (1.753 m)   Wt 254 lb 12.8 oz (115.6 kg)   BMI 37.63 kg/m²   Wt Readings from Last 3 Encounters:   03/15/21 254 lb 12.8 oz (115.6 kg)   12/15/20 253 lb (114.8 kg)   08/31/20 240 lb 1.6 oz (108.9 kg)       Physical Exam  Constitutional:       Appearance: He is well-developed. He is obese. Cardiovascular:      Rate and Rhythm: Normal rate and regular rhythm. Heart sounds: No murmur. No friction rub. No gallop. Pulmonary:      Effort: Pulmonary effort is normal.      Breath sounds: Normal breath sounds. No wheezing or rales. Abdominal:      General: Bowel sounds are normal. There is no distension. Palpations: Abdomen is soft. There is no mass. Tenderness: There is no abdominal tenderness. Skin:     General: Skin is warm and dry. Findings: No rash.            Chemistry        Component Value Date/Time     12/15/2020 1018    K 4.1 12/15/2020 1018     12/15/2020 1018    CO2 28 12/15/2020 1018    BUN 20 12/15/2020 1018    CREATININE 0.7 (L) 12/15/2020 1018        Component Value Date/Time    CALCIUM 9.5 12/15/2020 1018    ALKPHOS 107 12/15/2020 1018    AST 30 12/15/2020 1018    ALT 38 12/15/2020 1018 BILITOT 0.4 12/15/2020 1018          Lab Results   Component Value Date    WBC 9.6 07/29/2016    HGB 14.5 07/29/2016    HCT 42.6 07/29/2016    MCV 93.6 07/29/2016     07/29/2016     Lab Results   Component Value Date    LABA1C 7.5 12/15/2020     Lab Results   Component Value Date    .6 12/15/2020     Lab Results   Component Value Date    LABA1C 7.5 12/15/2020     No components found for: CHLPL  Lab Results   Component Value Date    TRIG 233 (H) 06/08/2020    TRIG 480 (H) 04/05/2019    TRIG 441 (H) 01/04/2019     Lab Results   Component Value Date    HDL 46 06/08/2020    HDL 44 04/05/2019    HDL 44 01/04/2019     Lab Results   Component Value Date    LDLCALC 49 06/08/2020    [address] see below 04/05/2019    LDLCALC see below 01/04/2019     Lab Results   Component Value Date    LABVLDL 47 06/08/2020    LABVLDL see below 04/05/2019    LABVLDL see below 01/04/2019         Assessment   Plan     1. Type 2 diabetes mellitus without complication, without long-term current use of insulin ([facility] [address]  Fair control: We will recheck in 3 months with A1c at that at that time. Continue meds    2. Essential hypertension  Controlled: Appears stable. We will continue current management and monitor for adverse reaction and disease progression. Follow-up as noted below      3. Morbidly obese (HCC)  Unchanged: Gave patient some goal setting counseling and reviewed lifestyle modifications to be made. Recheck 3 months    4. Anxiety  Controlled: Appears stable. We will continue current management and monitor for adverse reaction and disease progression. Follow-up as noted below    - MA BEHAV ASSMT W/SCORE & DOCD/STAND INSTRUMENT    Esequiel received counseling on the following healthy behaviors: nutrition and exercise    Patient given educational materials on Nutrition and Exercise    Discussed use, benefit, and side effects of prescribed medications. Barriers to medication compliance addressed.   All patient questions answered. Pt voiced understanding.        Health Maintenance   Topic Date Due    Hepatitis C screen  Never done    HIV screen  Never done    COVID-19 Vaccine (1) Never done    Hepatitis B vaccine (1 of 3 - Risk 3-dose series) Never done    Diabetic retinal exam  01/17/2020    Diabetic microalbuminuria test  06/08/2021    Lipid screen  06/08/2021    Diabetic foot exam  08/31/2021    A1C test (Diabetic or Prediabetic)  12/15/2021    Potassium monitoring  12/15/2021    Creatinine monitoring  12/15/2021    DTaP/Tdap/Td vaccine (2 - Td) 10/14/2029    Flu vaccine  Completed    Pneumococcal 0-64 years Vaccine  Completed    Hepatitis A vaccine  Aged Out    Hib vaccine  Aged Out    Meningococcal (ACWY) vaccine  Aged Out       RTC 3 months and as needed

## 2021-03-15 NOTE — PATIENT INSTRUCTIONS
Day: Tues  Time: noon  Location: [facility]  you contracted to   1) determine your health goals for the year  2) determine what changes you need to achieve those goals  3) design your daily routine, shopping habits etc to implement those changes. Examine your lifestyle and the barriers to bad and good habits and how you can design your life to make better choices    If you want to feel better these are the FUNDAMENTAL PILLARS of Wellness:    Make it EASY to do the RIGHT THINGS. 1)  You can choose to Get 150 min/week of moderate exercise (can talk but can't sing) or 75 min/week of vigorous exercise (can't talk)   This will enhance your sense of well being (Exercise is as good as medicine for depression.)    2)  You can choose to Get 7-9 hours of sleep per night    Detoxifies your brain, reduces risk of dementia    3)  You can choose to Strength Train 2 x a week on non-consecutive days   This will improve function and reduce risk of injury. Body weight type exercises such as Yoga and Pilates are good    4)  You can choose good nutrition. Only eat your goal weight (in lbs) x 10 calories/day and get 5 servings of Vegetables/day   Plant based diets reduce risk of heart attack/stroke and will help you feel full on less food. Avoid highly processed foods and processed carbohydrates. 5)  You can choose moderate alcohol intake < 1-2 drinks/day   Alcohol will disrupt your sleep and add calories to your day    6)  You can choose to develop a Charismatic/Supportive relationship. This will strengthen your resilience for the ups and downs. 7)  You can choose to Practice Mindfulness. An hour a day of prayer/meditation/gratitude will change your life! If you are trying to lose weight, here are some recommendations for weight loss:  Not every weight loss program is appropriate for everybody. ..  good online sources include Noom (more social with daily check ins), Lifesum (similar but less social) and Naturally slim, as well as Brandneu ($1500)    The GI Diet or \"Primal diet\", Intermittent fasting can also be effective choices. If you have diabetes treated with insulin be sure to ask me for specific guidance around meals. Take your desired weight in pounds and multiply by 10 and that is your average daily calorie allowance. For example if you wish to weigh 170 lb x 10 = 1700 blanquita/day (this is how to gradually lose the weight and maintain your desired weight). Avoid soda/coke and all \"wet carbs\" => Drink ice water instead    Drink a large glass of ice water before meals and EAT SLOWLY (talk while you eat)! Rethink your hunger => it means your losing weight.     Minimize highly processed carbohydrates as they stimulate your appetite:  Specifically cut back on Bread, Rice, Pasta and Potatoes    Avoid eating calories after 6 pm

## 2021-04-12 DIAGNOSIS — E78.00 HYPERCHOLESTEROLEMIA: ICD-10-CM

## 2021-04-12 RX ORDER — ATORVASTATIN CALCIUM 40 MG/1
TABLET, FILM COATED ORAL
Qty: 90 TABLET | Refills: 0 | Status: SHIPPED | OUTPATIENT
Start: 2021-04-12 | End: 2021-06-15 | Stop reason: SDUPTHER

## 2021-06-15 ENCOUNTER — OFFICE VISIT (OUTPATIENT)
Dept: PRIMARY CARE CLINIC | Age: 51
End: 2021-06-15
Payer: COMMERCIAL

## 2021-06-15 VITALS
SYSTOLIC BLOOD PRESSURE: 129 MMHG | WEIGHT: 249 LBS | DIASTOLIC BLOOD PRESSURE: 83 MMHG | BODY MASS INDEX: 36.77 KG/M2 | HEART RATE: 67 BPM

## 2021-06-15 DIAGNOSIS — E11.9 TYPE 2 DIABETES MELLITUS WITHOUT COMPLICATION, WITHOUT LONG-TERM CURRENT USE OF INSULIN (HCC): Primary | ICD-10-CM

## 2021-06-15 DIAGNOSIS — E11.9 TYPE 2 DIABETES MELLITUS WITHOUT COMPLICATION, WITHOUT LONG-TERM CURRENT USE OF INSULIN (HCC): ICD-10-CM

## 2021-06-15 DIAGNOSIS — I10 ESSENTIAL HYPERTENSION: ICD-10-CM

## 2021-06-15 DIAGNOSIS — E78.00 HYPERCHOLESTEROLEMIA: ICD-10-CM

## 2021-06-15 DIAGNOSIS — F41.9 ANXIETY: ICD-10-CM

## 2021-06-15 LAB
A/G RATIO: 1.9 (ref 1.1–2.2)
ALBUMIN SERPL-MCNC: 4.8 G/DL (ref 3.4–5)
ALP BLD-CCNC: 117 U/L (ref 40–129)
ALT SERPL-CCNC: 46 U/L (ref 10–40)
ANION GAP SERPL CALCULATED.3IONS-SCNC: 13 MMOL/L (ref 3–16)
AST SERPL-CCNC: 68 U/L (ref 15–37)
BILIRUB SERPL-MCNC: 0.4 MG/DL (ref 0–1)
BUN BLDV-MCNC: 22 MG/DL (ref 7–20)
CALCIUM SERPL-MCNC: 9.6 MG/DL (ref 8.3–10.6)
CHLORIDE BLD-SCNC: 103 MMOL/L (ref 99–110)
CHOLESTEROL, TOTAL: 161 MG/DL (ref 0–199)
CO2: 26 MMOL/L (ref 21–32)
CREAT SERPL-MCNC: 0.9 MG/DL (ref 0.9–1.3)
CREATININE URINE: 87.4 MG/DL (ref 39–259)
GFR AFRICAN AMERICAN: >60
GFR NON-AFRICAN AMERICAN: >60
GLOBULIN: 2.5 G/DL
GLUCOSE BLD-MCNC: 166 MG/DL (ref 70–99)
HBA1C MFR BLD: 7 %
HDLC SERPL-MCNC: 40 MG/DL (ref 40–60)
LDL CHOLESTEROL CALCULATED: ABNORMAL MG/DL
LDL CHOLESTEROL DIRECT: 82 MG/DL
MICROALBUMIN UR-MCNC: <1.2 MG/DL
MICROALBUMIN/CREAT UR-RTO: NORMAL MG/G (ref 0–30)
POTASSIUM SERPL-SCNC: 3.8 MMOL/L (ref 3.5–5.1)
SODIUM BLD-SCNC: 142 MMOL/L (ref 136–145)
TOTAL PROTEIN: 7.3 G/DL (ref 6.4–8.2)
TRIGL SERPL-MCNC: 330 MG/DL (ref 0–150)
VLDLC SERPL CALC-MCNC: ABNORMAL MG/DL

## 2021-06-15 PROCEDURE — 2022F DILAT RTA XM EVC RTNOPTHY: CPT | Performed by: FAMILY MEDICINE

## 2021-06-15 PROCEDURE — 99214 OFFICE O/P EST MOD 30 MIN: CPT | Performed by: FAMILY MEDICINE

## 2021-06-15 PROCEDURE — 1036F TOBACCO NON-USER: CPT | Performed by: FAMILY MEDICINE

## 2021-06-15 PROCEDURE — G8427 DOCREV CUR MEDS BY ELIG CLIN: HCPCS | Performed by: FAMILY MEDICINE

## 2021-06-15 PROCEDURE — G8417 CALC BMI ABV UP PARAM F/U: HCPCS | Performed by: FAMILY MEDICINE

## 2021-06-15 PROCEDURE — 83036 HEMOGLOBIN GLYCOSYLATED A1C: CPT | Performed by: FAMILY MEDICINE

## 2021-06-15 PROCEDURE — 3046F HEMOGLOBIN A1C LEVEL >9.0%: CPT | Performed by: FAMILY MEDICINE

## 2021-06-15 RX ORDER — METOPROLOL SUCCINATE 25 MG/1
25 TABLET, EXTENDED RELEASE ORAL DAILY
Qty: 90 TABLET | Refills: 3 | Status: SHIPPED | OUTPATIENT
Start: 2021-06-15 | End: 2022-06-13 | Stop reason: SDUPTHER

## 2021-06-15 RX ORDER — HYDROXYZINE 50 MG/1
TABLET, FILM COATED ORAL
Qty: 90 TABLET | Refills: 3 | Status: SHIPPED | OUTPATIENT
Start: 2021-06-15 | End: 2021-07-13

## 2021-06-15 RX ORDER — PAROXETINE HYDROCHLORIDE 20 MG/1
TABLET, FILM COATED ORAL
Qty: 90 TABLET | Refills: 3 | Status: SHIPPED | OUTPATIENT
Start: 2021-06-15 | End: 2022-06-13 | Stop reason: SDUPTHER

## 2021-06-15 RX ORDER — EMPAGLIFLOZIN 25 MG/1
25 TABLET, FILM COATED ORAL
Qty: 90 TABLET | Refills: 3 | Status: SHIPPED | OUTPATIENT
Start: 2021-06-15 | End: 2021-12-22 | Stop reason: SDUPTHER

## 2021-06-15 RX ORDER — ATORVASTATIN CALCIUM 40 MG/1
TABLET, FILM COATED ORAL
Qty: 90 TABLET | Refills: 3 | Status: SHIPPED | OUTPATIENT
Start: 2021-06-15 | End: 2021-07-13

## 2021-06-15 RX ORDER — HYDROCHLOROTHIAZIDE 12.5 MG/1
12.5 CAPSULE, GELATIN COATED ORAL EVERY MORNING
Qty: 90 CAPSULE | Refills: 3 | Status: SHIPPED | OUTPATIENT
Start: 2021-06-15 | End: 2021-07-13

## 2021-06-15 NOTE — PROGRESS NOTES
Chief Complaint   Patient presents with    Diabetes    Hypertension    Hyperlipidemia    Anxiety       HPI: Roger Resendez presents for evaluation and management of diabetes, hypertension, cholesterol and anxiety. Roger Resendez notes that he has been taking and tolerating his diabetes medications without lightheadedness diaphoresis tremulousness or anxiety. He has worked hard to cut out sugary drinks and attributes that to his diabetes control. He has been taking and tolerating his blood pressure medicine without lightheadedness or dizziness. He has been taking and tolerating his cholesterol medicine without abdominal pain or myalgia. Reports his anxiety is very well controlled with Paxil and hydroxyzine. Review of Systems    No Known Allergies  New Prescriptions    No medications on file     Current Outpatient Medications   Medication Sig Dispense Refill    atorvastatin (LIPITOR) 40 MG tablet TAKE ONE TABLET BY MOUTH ONCE NIGHTLY 90 tablet 0    PARoxetine (PAXIL) 20 MG tablet TAKE ONE TABLET BY MOUTH DAILY 90 tablet 3    metoprolol succinate (TOPROL XL) 25 MG extended release tablet Take 1 tablet by mouth daily 90 tablet 3    hydroCHLOROthiazide (MICROZIDE) 12.5 MG capsule Take 1 capsule by mouth every morning 90 capsule 3    empagliflozin (JARDIANCE) 25 MG tablet Take 25 mg by mouth every morning (before breakfast) 90 tablet 3    hydrOXYzine (ATARAX) 50 MG tablet TAKE ONE-HALF TABLET BY MOUTH EVERY 6 HOURS AS NEEDED FOR ANXIETY 90 tablet 3    blood glucose monitor strips 1 each by In Vitro route 2 times daily.  Accu Check Smart View Glucose Strips (Patient not taking: Reported on 6/15/2021) 300 strip 3    Alcohol Swabs (B-D SINGLE USE SWABS REGULAR) PADS USE TWO TIMES A DAY (Patient not taking: Reported on 6/15/2021) 100 each 3    blood glucose monitor kit and supplies Check blood sugar twice daily as needed Dx:E11.9 (Patient not taking: Reported on 6/15/2021) 1 kit 0    Lancets MISC Use twice a day with checking blood sugar (Patient not taking: Reported on 6/15/2021) 100 each 3     No current facility-administered medications for this visit. Past Medical History:   Diagnosis Date    Anxiety 3/3/2017    Hypercholesterolemia 1/4/2019    Hypertension     Hypertriglyceridemia 6/8/2018    Type 2 diabetes mellitus without complication (HCC) 4/18/2373         Objective   /83   Pulse 67   Wt 249 lb (112.9 kg)   BMI 36.77 kg/m²   Wt Readings from Last 3 Encounters:   06/15/21 249 lb (112.9 kg)   03/15/21 254 lb 12.8 oz (115.6 kg)   12/15/20 253 lb (114.8 kg)       Physical Exam  Constitutional:       Appearance: He is well-developed. He is obese. Cardiovascular:      Rate and Rhythm: Normal rate and regular rhythm. Heart sounds: No murmur heard. No friction rub. No gallop. Pulmonary:      Effort: Pulmonary effort is normal.      Breath sounds: Normal breath sounds. No wheezing or rales. Abdominal:      General: Bowel sounds are normal. There is no distension. Palpations: Abdomen is soft. There is no mass. Tenderness: There is no abdominal tenderness. Skin:     General: Skin is warm and dry. Findings: No rash.            Chemistry        Component Value Date/Time     12/15/2020 1018    K 4.1 12/15/2020 1018     12/15/2020 1018    CO2 28 12/15/2020 1018    BUN 20 12/15/2020 1018    CREATININE 0.7 (L) 12/15/2020 1018        Component Value Date/Time    CALCIUM 9.5 12/15/2020 1018    ALKPHOS 107 12/15/2020 1018    AST 30 12/15/2020 1018    ALT 38 12/15/2020 1018    BILITOT 0.4 12/15/2020 1018          Lab Results   Component Value Date    WBC 9.6 07/29/2016    HGB 14.5 07/29/2016    HCT 42.6 07/29/2016    MCV 93.6 07/29/2016     07/29/2016     Lab Results   Component Value Date    LABA1C 7.5 12/15/2020     Lab Results   Component Value Date    .6 12/15/2020     Lab Results   Component Value Date    LABA1C 7.5 12/15/2020     No components found for: CHLPL  Lab Results   Component Value Date    TRIG 233 (H) 06/08/2020    TRIG 480 (H) 04/05/2019    TRIG 441 (H) 01/04/2019     Lab Results   Component Value Date    HDL 46 06/08/2020    HDL 44 04/05/2019    HDL 44 01/04/2019     Lab Results   Component Value Date    LDLCALC 49 06/08/2020    LDLCALC see below 04/05/2019    LDLCALC see below 01/04/2019     Lab Results   Component Value Date    LABVLDL 47 06/08/2020    LABVLDL see below 04/05/2019    LABVLDL see below 01/04/2019         Assessment   Plan   1. Type 2 diabetes mellitus without complication, without long-term current use of insulin (HCC)  Assessment & Plan:   Well-controlled, continue current medications and Praised patient's efforts at cutting back on high glycemic index foods. Follow-up 6 months  Orders:  -     Lipid Panel; Future  -     Comprehensive Metabolic Panel; Future  -     POCT glycosylated hemoglobin (Hb A1C)  -     Microalbumin / Creatinine Urine Ratio; Future  -     empagliflozin (JARDIANCE) 25 MG tablet; Take 25 mg by mouth every morning (before breakfast), Disp-90 tablet, R-3Normal  2. Essential hypertension  Assessment & Plan:   Well-controlled, continue current medications and Check 6 months  Orders:  -     Comprehensive Metabolic Panel; Future  -     metoprolol succinate (TOPROL XL) 25 MG extended release tablet; Take 1 tablet by mouth daily, Disp-90 tablet, R-3Normal  -     hydroCHLOROthiazide (MICROZIDE) 12.5 MG capsule; Take 1 capsule by mouth every morning, Disp-90 capsule, R-3Normal  3. Hypercholesterolemia  Assessment & Plan:   Well-controlled, continue current medications and Recheck labs today to ensure continued control  Orders:  -     Lipid Panel; Future  -     Comprehensive Metabolic Panel; Future  -     atorvastatin (LIPITOR) 40 MG tablet; TAKE ONE TABLET BY MOUTH ONCE NIGHTLY, Disp-90 tablet, R-3Normal  4.  Anxiety  Assessment & Plan:   Well-controlled, continue current medications and Recheck 6 months  Orders:  - PARoxetine (PAXIL) 20 MG tablet; TAKE ONE TABLET BY MOUTH DAILY, Disp-90 tablet, R-3Normal  -     hydrOXYzine (ATARAX) 50 MG tablet; TAKE ONE-HALF TABLET BY MOUTH EVERY 6 HOURS AS NEEDED FOR ANXIETY, Disp-90 tablet, R-3Normal   Discussed use, benefit, and side effects of prescribed medications. Barriers to medication compliance addressed. All patient questions answered. Pt voiced understanding.          RTC 6 months

## 2021-06-15 NOTE — ASSESSMENT & PLAN NOTE
Well-controlled, continue current medications and Praised patient's efforts at cutting back on high glycemic index foods.   Follow-up 6 months

## 2021-06-15 NOTE — PATIENT INSTRUCTIONS
in pounds and multiply by 10 and that is your average daily calorie allowance. For example if you wish to weigh 170 lb x 10 = 1700 blanquita/day (this is how to gradually lose the weight and maintain your desired weight). Avoid soda/coke and all \"wet carbs\" => Drink ice water instead    Drink a large glass of ice water before meals and EAT SLOWLY (talk while you eat)! Rethink your hunger => it means your losing weight.     Minimize highly processed carbohydrates as they stimulate your appetite:  Specifically cut back on Bread, Rice, Pasta and Potatoes    Avoid eating calories after 6 pm

## 2021-07-13 ENCOUNTER — APPOINTMENT (OUTPATIENT)
Dept: GENERAL RADIOLOGY | Age: 51
End: 2021-07-13
Payer: COMMERCIAL

## 2021-07-13 ENCOUNTER — HOSPITAL ENCOUNTER (EMERGENCY)
Age: 51
Discharge: HOME | End: 2021-07-13
Payer: COMMERCIAL

## 2021-07-13 VITALS
WEIGHT: 243.61 LBS | BODY MASS INDEX: 36.08 KG/M2 | OXYGEN SATURATION: 96 % | HEART RATE: 74 BPM | RESPIRATION RATE: 16 BRPM | HEIGHT: 69 IN | SYSTOLIC BLOOD PRESSURE: 141 MMHG | DIASTOLIC BLOOD PRESSURE: 81 MMHG | TEMPERATURE: 97.9 F

## 2021-07-13 DIAGNOSIS — G89.29 ACUTE EXACERBATION OF CHRONIC LOW BACK PAIN: Primary | ICD-10-CM

## 2021-07-13 DIAGNOSIS — M54.50 ACUTE EXACERBATION OF CHRONIC LOW BACK PAIN: Primary | ICD-10-CM

## 2021-07-13 DIAGNOSIS — M51.36 DDD (DEGENERATIVE DISC DISEASE), LUMBAR: ICD-10-CM

## 2021-07-13 LAB
BILIRUBIN URINE: NEGATIVE
BLOOD, URINE: NEGATIVE
CLARITY: CLEAR
COLOR: YELLOW
GLUCOSE URINE: >=1000 MG/DL
KETONES, URINE: ABNORMAL MG/DL
LEUKOCYTE ESTERASE, URINE: NEGATIVE
MICROSCOPIC EXAMINATION: ABNORMAL
NITRITE, URINE: NEGATIVE
PH UA: 6 (ref 5–8)
PROTEIN UA: NEGATIVE MG/DL
SPECIFIC GRAVITY UA: >1.03 (ref 1–1.03)
URINE REFLEX TO CULTURE: ABNORMAL
URINE TYPE: ABNORMAL
UROBILINOGEN, URINE: 1 E.U./DL

## 2021-07-13 PROCEDURE — 99284 EMERGENCY DEPT VISIT MOD MDM: CPT

## 2021-07-13 PROCEDURE — 6370000000 HC RX 637 (ALT 250 FOR IP): Performed by: NURSE PRACTITIONER

## 2021-07-13 PROCEDURE — 81003 URINALYSIS AUTO W/O SCOPE: CPT

## 2021-07-13 PROCEDURE — 6360000002 HC RX W HCPCS: Performed by: NURSE PRACTITIONER

## 2021-07-13 PROCEDURE — 72100 X-RAY EXAM L-S SPINE 2/3 VWS: CPT

## 2021-07-13 PROCEDURE — 96372 THER/PROPH/DIAG INJ SC/IM: CPT

## 2021-07-13 RX ORDER — LIDOCAINE 4 G/G
1 PATCH TOPICAL ONCE
Status: DISCONTINUED | OUTPATIENT
Start: 2021-07-13 | End: 2021-07-13 | Stop reason: HOSPADM

## 2021-07-13 RX ORDER — CYCLOBENZAPRINE HCL 10 MG
10 TABLET ORAL 3 TIMES DAILY PRN
Qty: 20 TABLET | Refills: 0 | Status: SHIPPED | OUTPATIENT
Start: 2021-07-13 | End: 2021-07-20

## 2021-07-13 RX ORDER — KETOROLAC TROMETHAMINE 30 MG/ML
30 INJECTION, SOLUTION INTRAMUSCULAR; INTRAVENOUS ONCE
Status: COMPLETED | OUTPATIENT
Start: 2021-07-13 | End: 2021-07-13

## 2021-07-13 RX ORDER — IBUPROFEN 800 MG/1
800 TABLET ORAL EVERY 8 HOURS PRN
Qty: 20 TABLET | Refills: 0 | Status: SHIPPED | OUTPATIENT
Start: 2021-07-13 | End: 2021-12-22 | Stop reason: ALTCHOICE

## 2021-07-13 RX ORDER — ACETAMINOPHEN 500 MG
1000 TABLET ORAL 4 TIMES DAILY PRN
Qty: 60 TABLET | Refills: 0 | Status: SHIPPED | OUTPATIENT
Start: 2021-07-13 | End: 2021-12-22 | Stop reason: ALTCHOICE

## 2021-07-13 RX ORDER — LIDOCAINE 50 MG/G
1 PATCH TOPICAL DAILY
Qty: 10 PATCH | Refills: 0 | Status: SHIPPED | OUTPATIENT
Start: 2021-07-13 | End: 2021-07-23

## 2021-07-13 RX ORDER — ACETAMINOPHEN 500 MG
1000 TABLET ORAL ONCE
Status: COMPLETED | OUTPATIENT
Start: 2021-07-13 | End: 2021-07-13

## 2021-07-13 RX ORDER — ORPHENADRINE CITRATE 30 MG/ML
60 INJECTION INTRAMUSCULAR; INTRAVENOUS ONCE
Status: COMPLETED | OUTPATIENT
Start: 2021-07-13 | End: 2021-07-13

## 2021-07-13 RX ADMIN — ORPHENADRINE CITRATE 60 MG: 60 INJECTION INTRAMUSCULAR; INTRAVENOUS at 12:49

## 2021-07-13 RX ADMIN — ACETAMINOPHEN 1000 MG: 500 TABLET ORAL at 12:49

## 2021-07-13 RX ADMIN — KETOROLAC TROMETHAMINE 30 MG: 30 INJECTION, SOLUTION INTRAMUSCULAR; INTRAVENOUS at 12:49

## 2021-07-13 ASSESSMENT — PAIN DESCRIPTION - PAIN TYPE
TYPE: ACUTE PAIN
TYPE: ACUTE PAIN

## 2021-07-13 ASSESSMENT — PAIN SCALES - GENERAL
PAINLEVEL_OUTOF10: 4
PAINLEVEL_OUTOF10: 5
PAINLEVEL_OUTOF10: 8

## 2021-07-13 ASSESSMENT — PAIN DESCRIPTION - LOCATION
LOCATION: BACK
LOCATION: BACK

## 2021-07-13 ASSESSMENT — PAIN - FUNCTIONAL ASSESSMENT: PAIN_FUNCTIONAL_ASSESSMENT: 0-10

## 2021-07-13 NOTE — ED PROVIDER NOTES
[address]  [address] [address]  Dept: [phone]  Loc: [address] ENCOUNTER        This patient was not seen or evaluated by the attending physician. I evaluated this patient, the attending physician was available for consultation. CHIEF COMPLAINT    Chief Complaint   Patient presents with    Back Pain     lifting weights last monday and it started hurting, has gotten worse daily       HPI    Harper Acosta is a 52 y.o. male who presents with back pain, localized in the right lumbar region of the back, nonradiating. The onset was approximately 10 days ago. The duration has been intermittent since the onset. The quality of the pain is sharp. The pain worsens with movement. The context is patient was doing straight leg dead lifts when he felt his back spasm and pain. He states that he has been using icy hot and heating pads without any relief. Has not tried any over-the-counter analgesics for his pain. He states that he is diabetic but is not uncontrolled. He has no history of IV drug use, denies any bowel or bladder dysfunction or saddle anesthesia. He states that his pain is positional, and again nonradiating. Came to the ED for further evaluation and treatment. Sameer Franco REVIEW OF SYSTEMS    General: No fevers or chills  GI: No abdominal pain or vomiting  : No dysuria or hematuria  Musculoskeletal: see HPI  Neurologic: No bowel or bladder incontinence, No saddle anesthesia, No leg weakness  All other systems reviewed and are negative.     PAST MEDICAL & SURGICAL HISTORY    Past Medical History:   Diagnosis Date    Anxiety 3/3/2017    Hypercholesterolemia 1/4/2019    Hypertension     Hypertriglyceridemia 6/8/2018    Type 2 diabetes mellitus without complication ([facility] 8/96/6482     Past Surgical History:   Procedure Laterality Date    APPENDECTOMY  2016    Dr. Diana Melton MEDICATIONS  (may include discharge medications prescribed in the ED)  Current Outpatient Rx   Medication Sig Dispense Refill    cyclobenzaprine (FLEXERIL) 10 MG tablet Take 1 tablet by mouth 3 times daily as needed for Muscle spasms 20 tablet 0    ibuprofen (IBU) 800 MG tablet Take 1 tablet by mouth every 8 hours as needed for Pain 20 tablet 0    acetaminophen (TYLENOL) 500 MG tablet Take 2 tablets by mouth 4 times daily as needed for Pain 60 tablet 0    lidocaine (LIDODERM) 5 % Place 1 patch onto the skin daily for 10 days 12 hours on, 12 hours off. 10 patch 0    PARoxetine (PAXIL) 20 MG tablet TAKE ONE TABLET BY MOUTH DAILY 90 tablet 3    metoprolol succinate (TOPROL XL) 25 MG extended release tablet Take 1 tablet by mouth daily 90 tablet 3    empagliflozin (JARDIANCE) 25 MG tablet Take 25 mg by mouth every morning (before breakfast) 90 tablet 3       ALLERGIES    No Known Allergies    SOCIAL HISTORY    Social History     Socioeconomic History    Marital status: Single     Spouse name: Virgie Pineda    Number of children: 0    Years of education: None    Highest education level: None   Occupational History    Occupation:    Tobacco Use    Smoking status: Former Smoker     Packs/day: 1.00     Years: 30.00     Pack years: 30.00     Types: Cigarettes     Quit date:      Years since quittin.5    Smokeless tobacco: Never Used    Tobacco comment: quit 6 months ago   Vaping Use    Vaping Use: Never used   Substance and Sexual Activity    Alcohol use: No    Drug use: No    Sexual activity: None   Other Topics Concern    None   Social History Narrative    None     Social Determinants of Health     Financial Resource Strain:     Difficulty of Paying Living Expenses:    Food Insecurity:     Worried About Running Out of Food in the Last Year:     Ran Out of Food in the Last Year:    Transportation Needs:     Lack of Transportation (Medical):      Lack of Transportation (Non-Medical):    Physical Activity:     Days of Exercise per Week:     Minutes of Exercise per Session:    Stress:     Feeling of Stress :    Social Connections:     Frequency of Communication with Friends and Family:     Frequency of Social Gatherings with Friends and Family:     Attends [employer] Services:     Active Member of Clubs or Organizations:     Attends Club or Organization Meetings:     Marital Status:    Intimate Partner Violence:     Fear of Current or Ex-Partner:     Emotionally Abused:     Physically Abused:     Sexually Abused:        PHYSICAL EXAM    VITAL SIGNS: BP (!) 149/100   Pulse 74   Temp 97.9 °F (36.6 °C) (Temporal)   Resp 20   Ht 5' 9\" (1.753 m)   Wt 243 lb 9.7 oz (110.5 kg)   SpO2 96%   BMI 35.97 kg/m²    Constitutional:  Well developed, well nourished, no acute distress  HENT:  Atraumatic, moist mucus membranes  Neck: No JVD, supple   Respiratory:  No respiratory distress, normal breath sounds  Cardiovascular:  regular rate, no murmurs  GI:  Soft, nontender, no pulsatile masses or bruits of the abdomen  Musculoskeletal:  No edema, no acute deformities    Back: + right lumbar paraspinous tenderness to palpation, no bony midline tenderness, negative straight leg raise test bilaterally, no CVA tenderness  Integument:  Well hydrated, no rash  Vascular: Dorsalis pedis pulses are 2+ and equal bilaterally  Neurologic:  Motor testing reveals: intact thigh adduction, knee flexion and extension, ankle dorsiflexion, toe plantarflexion, and great toe dorsiflexion bilaterally, no gait abnormality is witnessing the patient ambulate in the department, sensation to light touch is intact in the groin and lower extremities bilaterally    RADIOLOGY  XR LUMBAR SPINE (2-3 VIEWS)   Final Result   No acute osseous abnormality of the lumbar spine. Degenerative disc disease   at L1-2 with multilevel facet arthropathy.              ED COURSE & MEDICAL DECISION MAKING    Please see EMR for medications administered in the ED. Differential Diagnosis: Cauda Equina Syndrome, Spinal Cord Compression, musculoskeletal pain, ligamental strain/sprain, ruptured/dissecting Abdominal Aortic Aneurysm, Fracture or dislocation, other    Patient is afebrile and nontoxic in appearance. No evidence of neurological deficit on exam.    Plain films as read by the radiologist as above. Due to the absence of neurological deficit, I have low suspicion at this time for epidural compression syndrome. I have a low suspicion for spinal epidural abscess due to the patient not having any red flags in the form of no history of uncontrolled diabetes, IV drug use, fevers or chills, bowel or bladder dysfunction, saddle anesthesia. Patient's pain is most likely musculoskeletal. I believe the patient is safe for discharge at this time. I'll prescribe symptomatic medications. Results for orders placed or performed during the hospital encounter of 07/13/21   Urinalysis Reflex to Culture    Specimen: Urine, clean catch   Result Value Ref Range    Color, UA YELLOW Straw/Yellow    Clarity, UA Clear Clear    Glucose, Ur >=1000 (A) Negative mg/dL    Bilirubin Urine Negative Negative    Ketones, Urine TRACE (A) Negative mg/dL    Specific Gravity, UA >1.030 1.005 - 1.030    Blood, Urine Negative Negative    pH, UA 6.0 5.0 - 8.0    Protein, UA Negative Negative mg/dL    Urobilinogen, Urine 1.0 <2.0 E.U./dL    Nitrite, Urine Negative Negative    Leukocyte Esterase, Urine Negative Negative    Microscopic Examination Not Indicated     Urine Type NotGiven     Urine Reflex to Culture Not Indicated        I estimate there is LOW risk for ABDOMINAL AORTIC ANEURYSM, CAUDA EQUINA SYNDROME, EPIDURAL MASS LESION, SPINAL STENOSIS, OR HERNIATED DISK CAUSING SEVERE STENOSIS, thus I consider the discharge disposition reasonable.  Mary Carmen Wallace and I have discussed the diagnosis and risks, and we agree with discharging home to follow-up with their primary doctor. We also discussed returning to the Emergency Department immediately if new or worsening symptoms occur. We have discussed the symptoms which are most concerning (e.g., saddle anesthesia, urinary or bowel incontinence or retention, changing or worsening pain) that necessitate immediate return. Blood pressure (!) 149/100, pulse 74, temperature 97.9 °F (36.6 °C), temperature source Temporal, resp. rate 20, height 5' 9\" (1.753 m), weight 243 lb 9.7 oz (110.5 kg), SpO2 96 %. The patient was instructed to follow up as an outpatient in 2 days with ortho/spine and primary care. The patient was instructed to return to the ED immediately for any new or worsening symptoms, including bowel or bladder incontinence, saddle anesthesia or leg weakness. The patient verbalized understanding. FINAL IMPRESSION    1.  Acute exacerbation of chronic low back pain        PLAN  Discharge with outpatient follow-up (see EMR)      (Please note that this note was completed with a voice recognition program.  Every attempt was made to edit the dictations, but inevitably there remain words that are mis-transcribed.)               ABHINAV Mckeon - VIVEK  07/13/21 [id number]

## 2021-07-13 NOTE — ED NOTES
Bed: E-45  Expected date:   Expected time:   Means of arrival:   Comments:  #2     Maya Bishop RN  07/13/21 2349

## 2021-12-22 ENCOUNTER — OFFICE VISIT (OUTPATIENT)
Dept: PRIMARY CARE CLINIC | Age: 51
End: 2021-12-22
Payer: COMMERCIAL

## 2021-12-22 VITALS
HEART RATE: 66 BPM | DIASTOLIC BLOOD PRESSURE: 78 MMHG | TEMPERATURE: 98.1 F | SYSTOLIC BLOOD PRESSURE: 136 MMHG | BODY MASS INDEX: 35.83 KG/M2 | WEIGHT: 242.6 LBS | RESPIRATION RATE: 16 BRPM

## 2021-12-22 DIAGNOSIS — I10 PRIMARY HYPERTENSION: ICD-10-CM

## 2021-12-22 DIAGNOSIS — E11.9 TYPE 2 DIABETES MELLITUS WITHOUT COMPLICATION, WITHOUT LONG-TERM CURRENT USE OF INSULIN (HCC): Primary | ICD-10-CM

## 2021-12-22 DIAGNOSIS — F41.9 ANXIETY: ICD-10-CM

## 2021-12-22 LAB — HBA1C MFR BLD: 6.7 %

## 2021-12-22 PROCEDURE — 83036 HEMOGLOBIN GLYCOSYLATED A1C: CPT | Performed by: FAMILY MEDICINE

## 2021-12-22 PROCEDURE — G8427 DOCREV CUR MEDS BY ELIG CLIN: HCPCS | Performed by: FAMILY MEDICINE

## 2021-12-22 PROCEDURE — G8484 FLU IMMUNIZE NO ADMIN: HCPCS | Performed by: FAMILY MEDICINE

## 2021-12-22 PROCEDURE — 1036F TOBACCO NON-USER: CPT | Performed by: FAMILY MEDICINE

## 2021-12-22 PROCEDURE — 99214 OFFICE O/P EST MOD 30 MIN: CPT | Performed by: FAMILY MEDICINE

## 2021-12-22 PROCEDURE — 2022F DILAT RTA XM EVC RTNOPTHY: CPT | Performed by: FAMILY MEDICINE

## 2021-12-22 PROCEDURE — G8417 CALC BMI ABV UP PARAM F/U: HCPCS | Performed by: FAMILY MEDICINE

## 2021-12-22 RX ORDER — EMPAGLIFLOZIN 25 MG/1
25 TABLET, FILM COATED ORAL
Qty: 90 TABLET | Refills: 3 | Status: SHIPPED | OUTPATIENT
Start: 2021-12-22

## 2021-12-22 NOTE — PROGRESS NOTES
Chief Complaint   Patient presents with    Hypertension    Diabetes    Anxiety       HPI: MELVA  presents for evaluation and management of diabetes, hypertension and anxiety. Rian Haynes notes he is feeling well. He has been taking tolerating his medications for diabetes without symptoms of diaphoresis tremulousness or anxiety. He is not checking his blood sugars but generally feels good. He is taking and tolerating his medicine for blood pressure without lightheadedness or dizziness. Does not check his blood pressures either. Reports his anxiety is well controlled with Paxil. KIAH 7 SCORE 3/15/2021   KIAH-7 Total Score 0     Interpretation of KIAH-7 score: 5-9 = mild anxiety, 10-14 = moderate anxiety, 15+ = severe anxiety. Recommend referral to behavioral health for scores 10 or greater. Review of Systems    No Known Allergies  New Prescriptions    No medications on file     Current Outpatient Medications   Medication Sig Dispense Refill    empagliflozin (JARDIANCE) 25 MG tablet Take 25 mg by mouth every morning (before breakfast) 90 tablet 3    PARoxetine (PAXIL) 20 MG tablet TAKE ONE TABLET BY MOUTH DAILY 90 tablet 3    metoprolol succinate (TOPROL XL) 25 MG extended release tablet Take 1 tablet by mouth daily 90 tablet 3     No current facility-administered medications for this visit. Past Medical History:   Diagnosis Date    Anxiety 3/3/2017    Hypercholesterolemia 1/4/2019    Hypertension     Hypertriglyceridemia 6/8/2018    Type 2 diabetes mellitus without complication (HCC) 5/24/6344         Objective   /78   Pulse 66   Temp 98.1 °F (36.7 °C) (Temporal)   Resp 16   Wt 242 lb 9.6 oz (110 kg)   BMI 35.83 kg/m²   Wt Readings from Last 3 Encounters:   12/22/21 242 lb 9.6 oz (110 kg)   07/13/21 243 lb 9.7 oz (110.5 kg)   06/15/21 249 lb (112.9 kg)       Physical Exam  Constitutional:       Appearance: He is well-developed.    Cardiovascular:      Rate and Rhythm: Normal rate and regular rhythm. Heart sounds: No murmur heard. No friction rub. No gallop. Pulmonary:      Effort: Pulmonary effort is normal.      Breath sounds: Normal breath sounds. No wheezing or rales. Abdominal:      General: Bowel sounds are normal. There is no distension. Palpations: Abdomen is soft. There is no mass. Tenderness: There is no abdominal tenderness. Skin:     General: Skin is warm and dry. Findings: No rash. Chemistry        Component Value Date/Time     06/15/2021 0956    K 3.8 06/15/2021 0956     06/15/2021 0956    CO2 26 06/15/2021 0956    BUN 22 (H) 06/15/2021 0956    CREATININE 0.9 06/15/2021 0956        Component Value Date/Time    CALCIUM 9.6 06/15/2021 0956    ALKPHOS 117 06/15/2021 0956    AST 68 (H) 06/15/2021 0956    ALT 46 (H) 06/15/2021 0956    BILITOT 0.4 06/15/2021 0956          Lab Results   Component Value Date    WBC 9.6 07/29/2016    HGB 14.5 07/29/2016    HCT 42.6 07/29/2016    MCV 93.6 07/29/2016     07/29/2016     Lab Results   Component Value Date    LABA1C 6.7 12/22/2021     Lab Results   Component Value Date    .6 12/15/2020     Lab Results   Component Value Date    LABA1C 6.7 12/22/2021     No components found for: CHLPL  Lab Results   Component Value Date    TRIG 330 (H) 06/15/2021    TRIG 233 (H) 06/08/2020    TRIG 480 (H) 04/05/2019     Lab Results   Component Value Date    HDL 40 06/15/2021    HDL 46 06/08/2020    HDL 44 04/05/2019     Lab Results   Component Value Date    LDLCALC see below 06/15/2021    LDLCALC 49 06/08/2020    LDLCALC see below 04/05/2019     Lab Results   Component Value Date    LABVLDL see below 06/15/2021    LABVLDL 47 06/08/2020    LABVLDL see below 04/05/2019         Assessment   Plan   1. Type 2 diabetes mellitus without complication, without long-term current use of insulin ([facility])  Hemoglobin A1c 6.7 today.   Praised patient's efforts at diet and exercise continue to monitor  - Comprehensive Metabolic Panel; Future  -  DIABETES FOOT EXAM  - POCT glycosylated hemoglobin (Hb A1C)  - empagliflozin (JARDIANCE) 25 MG tablet; Take 25 mg by mouth every morning (before breakfast)  Dispense: 90 tablet; Refill: 3    2. Primary hypertension  Controlled: Appears stable. We will continue current management and monitor for adverse reaction and disease progression. Follow-up as noted below    - Comprehensive Metabolic Panel; Future    3. Anxiety  Controlled: Appears stable. We will continue current management and monitor for adverse reaction and disease progression. Follow-up as noted below    - [place] [facility]AV ASSMT W/SCORE & DOCD/STAND INSTRUMENT    Discussed use, benefit, and side effects of prescribed medications. Barriers to medication compliance addressed. All patient questions answered. Pt voiced understanding. RTC Return in about 6 months (around 6/22/2022).

## 2022-01-27 ENCOUNTER — VIRTUAL VISIT (OUTPATIENT)
Dept: PRIMARY CARE CLINIC | Age: 52
End: 2022-01-27
Payer: COMMERCIAL

## 2022-01-27 ENCOUNTER — TELEPHONE (OUTPATIENT)
Dept: PRIMARY CARE CLINIC | Age: 52
End: 2022-01-27

## 2022-01-27 DIAGNOSIS — U07.1 COVID-19 VIRUS INFECTION: Primary | ICD-10-CM

## 2022-01-27 DIAGNOSIS — E11.9 TYPE 2 DIABETES MELLITUS WITHOUT COMPLICATION, WITHOUT LONG-TERM CURRENT USE OF INSULIN (HCC): ICD-10-CM

## 2022-01-27 PROCEDURE — 99442 PR PHYS/QHP TELEPHONE EVALUATION 11-20 MIN: CPT | Performed by: FAMILY MEDICINE

## 2022-01-27 RX ORDER — METHYLPREDNISOLONE 4 MG/1
TABLET ORAL
Qty: 1 KIT | Refills: 0 | Status: SHIPPED | OUTPATIENT
Start: 2022-01-27 | End: 2022-06-13

## 2022-01-27 RX ORDER — BENZONATATE 200 MG/1
200 CAPSULE ORAL 3 TIMES DAILY PRN
Qty: 20 CAPSULE | Refills: 0 | Status: SHIPPED | OUTPATIENT
Start: 2022-01-27 | End: 2022-02-03

## 2022-01-27 NOTE — TELEPHONE ENCOUNTER
----- Message from Nicola Ac sent at 1/27/2022 11:26 AM EST -----  Subject: Message to Provider    QUESTIONS  Information for Provider? patient is calling in to stated he have received   a positive covid test and he wants to know next steps he also ask if maybe   we can prescribe him medication and have it get sent to his pharmacy Take   1 tablet by mouth daily Pharmacy: [address] [place] [address]   [address] Marroquin can please contact patient   ---------------------------------------------------------------------------  --------------  [address]  What is the best way for the office to contact you? OK to leave message on   voicemail  Preferred Call Back Phone Number? [phone]  ---------------------------------------------------------------------------  --------------  SCRIPT ANSWERS  Relationship to Patient?  Self

## 2022-01-27 NOTE — PATIENT INSTRUCTIONS
What happens if I have a positive test?       If you have symptoms:   Isolate until all three of these things are true: 1) your symptoms are better, 2) it has been 5 days since you first felt sick, and 3) you have had no fever for at least 24 hours without using medicine that lowers fever. Drink plenty of fluids (you want your urine to be very light in color - almost Gin Clear). You may take medicine for pain or fever if you need to. Rest as much as you can. If you do not have symptoms:   Stay home for 5 days after the date you were tested. Follow care instructions from your doctor or other healthcare provider. Seek emergency medical care immediately if you have trouble breathing, persistent pain or pressure in the chest, new confusion, inability to wake or stay awake, or bluish lips or face. If you are able, purchase a pulse oximeter ($15-50 on amazon) and monitor your oxygenation. Call the office if O2 sat 94% or less  Go to ER if O2 sat 90% or less or 94% or less with high risk co-morbid conditions or worsening dyspnea, dyspnea at rest or chest discomfort,   Tessalon for cough  Tylenol for fever/myalgia,  Medrol dose pack if seems to be worsening at f/u 5-7 days from onset of symptoms     *High risk co-morbid conditions: age > 72, COPD, Heart Disease, DM, CKD, smoking, SICKLE CELL, Pregnancy  **Antibody therapy for: non-hospitalized mild to mod illness (not on oxygen) with BMI >35, CKD, DM, immunosuppression, > 71 yo or > 56yo with CVD, COPD, HTN    Pursed lip breathing exercises:   Sitting upright or slightly reclining, relax your neck and shoulder muscles. With your mouth closed, inhale through the nose for 2 seconds, as if smelling a flower. Exhale slowly (for 4 seconds if possible) through pursed lips, as if blowing out birthday candles. Repeat inhalation and exhalation cycles for 2 minutes, several times a day and when needed.    Deep breathing exercises:   Recline in bed or on a sofa with a pillow under your head and knees. If reclining is not possible, this may be done while sitting upright. Place one hand on your belly, the other hand on your chest.   Slowly inhale through your nose; let your lungs fill with air, allowing your belly to rise. (The hand on the belly should move more than the hand on the chest.)   Breathe out through your nose, and as you exhale, feel your belly lower. Repeat the inhalation and exhalation cycles for 2 to 5 minutes several times a day and when needed.

## 2022-01-27 NOTE — PROGRESS NOTES
2022    TELEHEALTH EVALUATION -- Audio/Visual (During QUYAK-92 public health emergency)  Patient had a telephonic visit as he has no computer or smart phone  HPI:    Hannah Lee (:  1970) has requested an audio/video evaluation for the following concern(s):    COVIDBrittney Herrera notes he has been feeling poorly for about a week or 2. He had a Covid test done 2 days ago and it came back positive today. He notes some shortness of breath and slight cough as well as chills. Notes no fever or headache or muscle aches. He does have a history of diabetes that has been well controlled. He is also overweight    Review of Systems    Prior to Visit Medications    Medication Sig Taking?  Authorizing Provider   empagliflozin (JARDIANCE) 25 MG tablet Take 25 mg by mouth every morning (before breakfast)  Apoorva Irizarry MD   PARoxetine (PAXIL) 20 MG tablet TAKE ONE TABLET BY MOUTH DAILY  Apoorva Irizarry MD   metoprolol succinate (TOPROL XL) 25 MG extended release tablet Take 1 tablet by mouth daily  Apoorva Irizarry MD       Social History     Tobacco Use    Smoking status: Former Smoker     Packs/day: 1.00     Years: 30.00     Pack years: 30.00     Types: Cigarettes     Quit date:      Years since quittin.0    Smokeless tobacco: Never Used    Tobacco comment: quit 6 months ago   Vaping Use    Vaping Use: Never used   Substance Use Topics    Alcohol use: No    Drug use: No        No Known Allergies,   Past Medical History:   Diagnosis Date    Anxiety 3/3/2017    Hypercholesterolemia 2019    Hypertension     Hypertriglyceridemia 2018    [facility]    ,   Past Surgical History:   Procedure Laterality Date    APPENDECTOMY      Dr. Adina Queen   ,   Social History     Tobacco Use    Smoking status: Former Smoker     Packs/day: 1.00     Years: 30.00     Pack years: 30.00     Types: Cigarettes     Quit date:      Years since quitting: 6.0    Smokeless tobacco: Never Used    Tobacco comment: quit 6 months ago   Vaping Use    Vaping Use: Never used   Substance Use Topics    Alcohol use: No    Drug use: No       PHYSICAL EXAMINATION:  There were no vitals taken for this visit. Wt Readings from Last 3 Encounters:   12/22/21 242 lb 9.6 oz (110 kg)   07/13/21 243 lb 9.7 oz (110.5 kg)   06/15/21 249 lb (112.9 kg)       [ INSTRUCTIONS:  \"[x]\" Indicates a positive item  \"[]\" Indicates a negative item  -- DELETE ALL ITEMS NOT EXAMINED]  [] Alert  [] Oriented to person/place/time    [] No apparent distress  []  Appears Unwell:     [] Breathing appears normal  [] Appears tachypneic      [] Rash on visible skin:    [] Cranial Nerves II-XII grossly intact    [] Motor grossly intact in visible upper extremities    [] Motor grossly intact in visible lower extremities    [] Normal Mood  [] Anxious appearing    [] Depressed appearing     [] OTHER:      Due to this being a TeleHealth encounter, evaluation of the following organ systems is limited: Vitals/Constitutional/EENT/Resp/CV/GI//MS/Neuro/Skin/Heme-Lymph-Imm. Lab Results   Component Value Date     06/15/2021    K 3.8 06/15/2021     06/15/2021    CO2 26 06/15/2021    BUN 22 (H) 06/15/2021    CREATININE 0.9 06/15/2021    GLUCOSE 166 (H) 06/15/2021    CALCIUM 9.6 06/15/2021    PROT 7.3 06/15/2021    LABALBU 4.8 06/15/2021    BILITOT 0.4 06/15/2021    ALKPHOS 117 06/15/2021    AST 68 (H) 06/15/2021    ALT 46 (H) 06/15/2021    LABGLOM >60 06/15/2021    GFRAA >60 06/15/2021    AGRATIO 1.9 06/15/2021    GLOB 2.5 06/15/2021     Lab Results   Component Value Date    LABA1C 6.7 12/22/2021     Lab Results   Component Value Date    .6 12/15/2020         ASSESSMENT/PLAN:  1. COVID-19 virus infection  Counseled patient on management of symptoms and hypoxia. Follow-up in 2 weeks in clinic. Call if O2 sats are dropping below 90  - methylPREDNISolone (MEDROL DOSEPACK) 4 MG tablet; Take by mouth. Dispense: 1 kit; Refill: 0  - benzonatate (TESSALON) 200 MG capsule; Take 1 capsule by mouth 3 times daily as needed for Cough  Dispense: 20 capsule; Refill: 0    2. Type 2 diabetes mellitus without complication, without long-term current use of insulin ([facility]  Controlled: Continue meds and follow      Return in about 2 weeks (around 2/10/2022). An  electronic signature was used to authenticate this note.    --Crystal Castro MD on 1/27/2022 at 11:46 AM        Pursuant to the emergency declaration under the [facility], [facility] waiver authority and the [employer] Act, this Virtual  Visit was conducted, with patient's consent, to reduce the patient's risk of exposure to COVID-19 and provide continuity of care for an established patient. Services were provided through a video synchronous discussion virtually to substitute for in-person clinic visit.

## 2022-03-07 ENCOUNTER — OFFICE VISIT (OUTPATIENT)
Dept: PRIMARY CARE CLINIC | Age: 52
End: 2022-03-07
Payer: COMMERCIAL

## 2022-03-07 VITALS
HEIGHT: 69 IN | OXYGEN SATURATION: 98 % | WEIGHT: 243 LBS | DIASTOLIC BLOOD PRESSURE: 87 MMHG | TEMPERATURE: 97 F | SYSTOLIC BLOOD PRESSURE: 127 MMHG | BODY MASS INDEX: 35.99 KG/M2 | HEART RATE: 78 BPM

## 2022-03-07 DIAGNOSIS — U07.1 COVID-19 VIRUS INFECTION: Primary | ICD-10-CM

## 2022-03-07 DIAGNOSIS — E11.9 TYPE 2 DIABETES MELLITUS WITHOUT COMPLICATION, WITHOUT LONG-TERM CURRENT USE OF INSULIN (HCC): ICD-10-CM

## 2022-03-07 DIAGNOSIS — I10 PRIMARY HYPERTENSION: ICD-10-CM

## 2022-03-07 PROCEDURE — 2022F DILAT RTA XM EVC RTNOPTHY: CPT | Performed by: FAMILY MEDICINE

## 2022-03-07 PROCEDURE — 3046F HEMOGLOBIN A1C LEVEL >9.0%: CPT | Performed by: FAMILY MEDICINE

## 2022-03-07 PROCEDURE — G8484 FLU IMMUNIZE NO ADMIN: HCPCS | Performed by: FAMILY MEDICINE

## 2022-03-07 PROCEDURE — G8417 CALC BMI ABV UP PARAM F/U: HCPCS | Performed by: FAMILY MEDICINE

## 2022-03-07 PROCEDURE — 99214 OFFICE O/P EST MOD 30 MIN: CPT | Performed by: FAMILY MEDICINE

## 2022-03-07 PROCEDURE — 1036F TOBACCO NON-USER: CPT | Performed by: FAMILY MEDICINE

## 2022-03-07 PROCEDURE — G8427 DOCREV CUR MEDS BY ELIG CLIN: HCPCS | Performed by: FAMILY MEDICINE

## 2022-03-07 SDOH — ECONOMIC STABILITY: FOOD INSECURITY: WITHIN THE PAST 12 MONTHS, YOU WORRIED THAT YOUR FOOD WOULD RUN OUT BEFORE YOU GOT MONEY TO BUY MORE.: NEVER TRUE

## 2022-03-07 SDOH — ECONOMIC STABILITY: TRANSPORTATION INSECURITY
IN THE PAST 12 MONTHS, HAS THE LACK OF TRANSPORTATION KEPT YOU FROM MEDICAL APPOINTMENTS OR FROM GETTING MEDICATIONS?: NO

## 2022-03-07 SDOH — ECONOMIC STABILITY: TRANSPORTATION INSECURITY
IN THE PAST 12 MONTHS, HAS LACK OF TRANSPORTATION KEPT YOU FROM MEETINGS, WORK, OR FROM GETTING THINGS NEEDED FOR DAILY LIVING?: NO

## 2022-03-07 SDOH — ECONOMIC STABILITY: FOOD INSECURITY: WITHIN THE PAST 12 MONTHS, THE FOOD YOU BOUGHT JUST DIDN'T LAST AND YOU DIDN'T HAVE MONEY TO GET MORE.: NEVER TRUE

## 2022-03-07 ASSESSMENT — SOCIAL DETERMINANTS OF HEALTH (SDOH): HOW HARD IS IT FOR YOU TO PAY FOR THE VERY BASICS LIKE FOOD, HOUSING, MEDICAL CARE, AND HEATING?: NOT HARD AT ALL

## 2022-03-07 NOTE — PROGRESS NOTES
Chief Complaint   Patient presents with    Diabetes    Hypertension    Other     covid f/u       HPI: MELVA  presents for evaluation and management of diabetes, hypertension and follow-up on Covid. Niyah Shaffer had COVID in the end of January. He noted he was short of breath at the time but his symptoms have resolved. He is feeling well now. He notes he is taking and tolerating his medication for diabetes. Denies any side effects from the meds and notes no symptoms of hyper or hypoglycemia. He is taking tolerating his medication for blood pressure. Denies lightheadedness or dizziness. Review of Systems    No Known Allergies  New Prescriptions    No medications on file     Current Outpatient Medications   Medication Sig Dispense Refill    empagliflozin (JARDIANCE) 25 MG tablet Take 25 mg by mouth every morning (before breakfast) 90 tablet 3    PARoxetine (PAXIL) 20 MG tablet TAKE ONE TABLET BY MOUTH DAILY 90 tablet 3    metoprolol succinate (TOPROL XL) 25 MG extended release tablet Take 1 tablet by mouth daily 90 tablet 3    methylPREDNISolone (MEDROL DOSEPACK) 4 MG tablet Take by mouth. (Patient not taking: Reported on 3/7/2022) 1 kit 0     No current facility-administered medications for this visit. Past Medical History:   Diagnosis Date    Anxiety 3/3/2017    Hypercholesterolemia 1/4/2019    Hypertension     Hypertriglyceridemia 6/8/2018    Type 2 diabetes mellitus without complication (HCC) 2/45/4773         Objective   /87   Pulse 78   Temp 97 °F (36.1 °C)   Ht 5' 9\" (1.753 m)   Wt 243 lb (110.2 kg)   SpO2 98%   BMI 35.88 kg/m²   Wt Readings from Last 3 Encounters:   03/07/22 243 lb (110.2 kg)   12/22/21 242 lb 9.6 oz (110 kg)   07/13/21 243 lb 9.7 oz (110.5 kg)       Physical Exam  Constitutional:       Appearance: He is well-developed. Cardiovascular:      Rate and Rhythm: Normal rate and regular rhythm. Heart sounds: No murmur heard. No friction rub.  No gallop. Pulmonary:      Effort: Pulmonary effort is normal.      Breath sounds: Normal breath sounds. No wheezing or rales. Abdominal:      General: Bowel sounds are normal. There is no distension. Palpations: Abdomen is soft. There is no mass. Tenderness: There is no abdominal tenderness. Skin:     General: Skin is warm and dry. Findings: No rash. Chemistry        Component Value Date/Time     06/15/2021 0956    K 3.8 06/15/2021 0956     06/15/2021 0956    CO2 26 06/15/2021 0956    BUN 22 (H) 06/15/2021 0956    CREATININE 0.9 06/15/2021 0956        Component Value Date/Time    CALCIUM 9.6 06/15/2021 0956    ALKPHOS 117 06/15/2021 0956    AST 68 (H) 06/15/2021 0956    ALT 46 (H) 06/15/2021 0956    BILITOT 0.4 06/15/2021 0956          Lab Results   Component Value Date    WBC 9.6 07/29/2016    HGB 14.5 07/29/2016    HCT 42.6 07/29/2016    MCV 93.6 07/29/2016     07/29/2016     Lab Results   Component Value Date    LABA1C 6.7 12/22/2021     Lab Results   Component Value Date    .6 12/15/2020     Lab Results   Component Value Date    LABA1C 6.7 12/22/2021     No components found for: CHLPL  Lab Results   Component Value Date    TRIG 330 (H) 06/15/2021    TRIG 233 (H) 06/08/2020    TRIG 480 (H) 04/05/2019     Lab Results   Component Value Date    HDL 40 06/15/2021    HDL 46 06/08/2020    HDL 44 04/05/2019     Lab Results   Component Value Date    LDLCALC see below 06/15/2021    LDLCALC 49 06/08/2020    LDLCALC see below 04/05/2019     Lab Results   Component Value Date    LABVLDL see below 06/15/2021    LABVLDL 47 06/08/2020    LABVLDL see below 04/05/2019         Assessment   Plan   1. COVID-19 virus infection  Appears to have convalesced without complication. Follow-up as needed    2. Type 2 diabetes mellitus without complication, without long-term current use of insulin (HCC)  Good control: Continue meds and recheck 3 months with labs at that time    3. Primary hypertension  Controlled: Appears stable. We will continue current management and monitor for adverse reaction and disease progression. Follow-up as noted below          Discussed use, benefit, and side effects of prescribed medications. Barriers to medication compliance addressed. All patient questions answered. Pt voiced understanding. RTC Return in about 3 months (around 6/7/2022).

## 2022-06-13 ENCOUNTER — OFFICE VISIT (OUTPATIENT)
Dept: PRIMARY CARE CLINIC | Age: 52
End: 2022-06-13
Payer: COMMERCIAL

## 2022-06-13 VITALS
TEMPERATURE: 97.2 F | OXYGEN SATURATION: 98 % | SYSTOLIC BLOOD PRESSURE: 130 MMHG | WEIGHT: 242 LBS | HEART RATE: 68 BPM | DIASTOLIC BLOOD PRESSURE: 84 MMHG | BODY MASS INDEX: 36.68 KG/M2 | HEIGHT: 68 IN

## 2022-06-13 DIAGNOSIS — E11.9 TYPE 2 DIABETES MELLITUS WITHOUT COMPLICATION, WITHOUT LONG-TERM CURRENT USE OF INSULIN (HCC): Primary | ICD-10-CM

## 2022-06-13 DIAGNOSIS — I10 PRIMARY HYPERTENSION: ICD-10-CM

## 2022-06-13 DIAGNOSIS — E11.9 TYPE 2 DIABETES MELLITUS WITHOUT COMPLICATION, WITHOUT LONG-TERM CURRENT USE OF INSULIN (HCC): ICD-10-CM

## 2022-06-13 DIAGNOSIS — I10 ESSENTIAL HYPERTENSION: ICD-10-CM

## 2022-06-13 DIAGNOSIS — Z23 NEED FOR VACCINATION: ICD-10-CM

## 2022-06-13 DIAGNOSIS — F41.9 ANXIETY: ICD-10-CM

## 2022-06-13 LAB — HBA1C MFR BLD: 8.8 %

## 2022-06-13 PROCEDURE — 1036F TOBACCO NON-USER: CPT | Performed by: FAMILY MEDICINE

## 2022-06-13 PROCEDURE — 99214 OFFICE O/P EST MOD 30 MIN: CPT | Performed by: FAMILY MEDICINE

## 2022-06-13 PROCEDURE — 83036 HEMOGLOBIN GLYCOSYLATED A1C: CPT | Performed by: FAMILY MEDICINE

## 2022-06-13 PROCEDURE — 96127 BRIEF EMOTIONAL/BEHAV ASSMT: CPT | Performed by: FAMILY MEDICINE

## 2022-06-13 PROCEDURE — 3046F HEMOGLOBIN A1C LEVEL >9.0%: CPT | Performed by: FAMILY MEDICINE

## 2022-06-13 PROCEDURE — G8417 CALC BMI ABV UP PARAM F/U: HCPCS | Performed by: FAMILY MEDICINE

## 2022-06-13 PROCEDURE — G8427 DOCREV CUR MEDS BY ELIG CLIN: HCPCS | Performed by: FAMILY MEDICINE

## 2022-06-13 PROCEDURE — 2022F DILAT RTA XM EVC RTNOPTHY: CPT | Performed by: FAMILY MEDICINE

## 2022-06-13 RX ORDER — PAROXETINE HYDROCHLORIDE 20 MG/1
TABLET, FILM COATED ORAL
Qty: 90 TABLET | Refills: 3 | Status: SHIPPED | OUTPATIENT
Start: 2022-06-13

## 2022-06-13 RX ORDER — METOPROLOL SUCCINATE 25 MG/1
25 TABLET, EXTENDED RELEASE ORAL DAILY
Qty: 90 TABLET | Refills: 3 | Status: SHIPPED | OUTPATIENT
Start: 2022-06-13

## 2022-06-13 ASSESSMENT — PATIENT HEALTH QUESTIONNAIRE - PHQ9
SUM OF ALL RESPONSES TO PHQ QUESTIONS 1-9: 2
4. FEELING TIRED OR HAVING LITTLE ENERGY: 0
3. TROUBLE FALLING OR STAYING ASLEEP: 2
9. THOUGHTS THAT YOU WOULD BE BETTER OFF DEAD, OR OF HURTING YOURSELF: 0
1. LITTLE INTEREST OR PLEASURE IN DOING THINGS: 0
2. FEELING DOWN, DEPRESSED OR HOPELESS: 0
SUM OF ALL RESPONSES TO PHQ QUESTIONS 1-9: 2
SUM OF ALL RESPONSES TO PHQ QUESTIONS 1-9: 2
6. FEELING BAD ABOUT YOURSELF - OR THAT YOU ARE A FAILURE OR HAVE LET YOURSELF OR YOUR FAMILY DOWN: 0
SUM OF ALL RESPONSES TO PHQ QUESTIONS 1-9: 2
8. MOVING OR SPEAKING SO SLOWLY THAT OTHER PEOPLE COULD HAVE NOTICED. OR THE OPPOSITE, BEING SO FIGETY OR RESTLESS THAT YOU HAVE BEEN MOVING AROUND A LOT MORE THAN USUAL: 0
5. POOR APPETITE OR OVEREATING: 0
7. TROUBLE CONCENTRATING ON THINGS, SUCH AS READING THE NEWSPAPER OR WATCHING TELEVISION: 0
SUM OF ALL RESPONSES TO PHQ9 QUESTIONS 1 & 2: 0

## 2022-06-13 ASSESSMENT — ANXIETY QUESTIONNAIRES
1. FEELING NERVOUS, ANXIOUS, OR ON EDGE: 0
5. BEING SO RESTLESS THAT IT IS HARD TO SIT STILL: 0
7. FEELING AFRAID AS IF SOMETHING AWFUL MIGHT HAPPEN: 0
3. WORRYING TOO MUCH ABOUT DIFFERENT THINGS: 0
6. BECOMING EASILY ANNOYED OR IRRITABLE: 0
GAD7 TOTAL SCORE: 0
4. TROUBLE RELAXING: 0
2. NOT BEING ABLE TO STOP OR CONTROL WORRYING: 0

## 2022-06-13 ASSESSMENT — PATIENT HEALTH QUESTIONNAIRE - GENERAL: IN THE PAST YEAR HAVE YOU FELT DEPRESSED OR SAD MOST DAYS, EVEN IF YOU FELT OKAY SOMETIMES?: NO

## 2022-06-13 NOTE — PROGRESS NOTES
Chief Complaint   Patient presents with    Diabetes    Hypertension    Anxiety       HPI: MELVA  presents for evaluation and management of diabetes hypertension and anxiety. C/ [address]ras [address] notes that he has been taking and tolerating his diabetes medication. He notes he has had a little bit more urinary frequency this last week than normal.  He tells me that he is relaxed his dietary habits a little bit and eating more pizza and beer every now and then. Compared to the fall, when his diet was much more regimented, he has slipped a little bit. He is taking and tolerating his blood pressure medicine without lightheadedness or dizziness. Reports his anxiety is very well controlled on Paxil. Notes he needs refills.   KIAH-7 SCREENING 6/13/2022 3/15/2021   Feeling nervous, anxious, or on edge Not at all -   Not being able to stop or control worrying Not at all -   Worrying too much about different things Not at all -   Trouble relaxing Not at all -   Being so restless that it is hard to sit still Not at all -   Becoming easily annoyed or irritable Not at all -   Feeling afraid as if something awful might happen Not at all -   KIAH-7 Total Score 0 -   Feeling nervous, anxious, or on edge - 0-Not at all   Not able to stop or control worrying - 0-Not at all   Worrying too much about different things - 0-Not at all   Trouble relaxing - 0-Not at all   Being so restless that it's hard to sit still - 0-Not at all   Becoming easily annoyed or irritable - 0-Not at all   Feeling afraid as if something awful might happen - 0-Not at all   KIAH-7 Total Score - 0           Today's PHQ:    PHQ Scores 6/13/2022 3/15/2021 12/15/2020 6/8/2020 7/15/2019 4/5/2019 5/11/2018   PHQ2 Score 0 0 0 0 0 0 0   PHQ9 Score 2 0 1 0 0 0 0     Interpretation of Total Score Depression Severity: 1-4 = Minimal depression, 5-9 = Mild depression, 10-14 = Moderate depression, 15-19 = Moderately severe depression, 20-27 = Severe depression        Review of Systems    No Known Allergies  New Prescriptions    METFORMIN (GLUCOPHAGE) 1000 MG TABLET    Take 1 tablet by mouth daily (with breakfast)     Current Outpatient Medications   Medication Sig Dispense Refill    PARoxetine (PAXIL) 20 MG tablet TAKE ONE TABLET BY MOUTH DAILY 90 tablet 3    metoprolol succinate (TOPROL XL) 25 MG extended release tablet Take 1 tablet by mouth daily 90 tablet 3    metFORMIN (GLUCOPHAGE) 1000 MG tablet Take 1 tablet by mouth daily (with breakfast) 180 tablet 3    empagliflozin (JARDIANCE) 25 MG tablet Take 25 mg by mouth every morning (before breakfast) 90 tablet 3     No current facility-administered medications for this visit. Past Medical History:   Diagnosis Date    Anxiety 3/3/2017    Hypercholesterolemia 1/4/2019    Hypertension     Hypertriglyceridemia 6/8/2018    Type 2 diabetes mellitus without complication ([facility]) 3/40/0127         Objective   /84   Pulse 68   Temp 97.2 °F (36.2 °C)   Ht 5' 8\" (1.727 m)   Wt 242 lb (109.8 kg)   SpO2 98%   BMI 36.80 kg/m²   Wt Readings from Last 3 Encounters:   06/13/22 242 lb (109.8 kg)   03/07/22 243 lb (110.2 kg)   12/22/21 242 lb 9.6 oz (110 kg)       Physical Exam  Constitutional:       Appearance: He is well-developed. He is obese. Cardiovascular:      Rate and Rhythm: Normal rate and regular rhythm. Heart sounds: No murmur heard. No friction rub. No gallop. Pulmonary:      Effort: Pulmonary effort is normal.      Breath sounds: Normal breath sounds. No wheezing or rales. Abdominal:      General: Bowel sounds are normal. There is no distension. Palpations: Abdomen is soft. There is no mass. Tenderness: There is no abdominal tenderness. Skin:     General: Skin is warm and dry. Findings: No rash.            Chemistry        Component Value Date/Time     06/15/2021 0956    K 3.8 06/15/2021 0956     06/15/2021 0956    CO2 26 06/15/2021 0956    BUN 22 (H) 06/15/2021 0658 CREATININE 0.9 06/15/2021 0956        Component Value Date/Time    CALCIUM 9.6 06/15/2021 0956    ALKPHOS 117 06/15/2021 0956    AST 68 (H) 06/15/2021 0956    ALT 46 (H) 06/15/2021 0956    BILITOT 0.4 06/15/2021 0956          Lab Results   Component Value Date    WBC 9.6 07/29/2016    HGB 14.5 07/29/2016    HCT 42.6 07/29/2016    MCV 93.6 07/29/2016     07/29/2016     Lab Results   Component Value Date    LABA1C 6.7 12/22/2021     Lab Results   Component Value Date    .6 12/15/2020     Lab Results   Component Value Date    LABA1C 6.7 12/22/2021     No components found for: CHLPL  Lab Results   Component Value Date    TRIG 330 (H) 06/15/2021    TRIG 233 (H) 06/08/2020    TRIG 480 (H) 04/05/2019     Lab Results   Component Value Date    HDL 40 06/15/2021    HDL 46 06/08/2020    HDL 44 04/05/2019     Lab Results   Component Value Date    LDLCALC see below 06/15/2021    LDLCALC 49 06/08/2020    LDLCALC see below 04/05/2019     Lab Results   Component Value Date    LABVLDL see below 06/15/2021    LABVLDL 47 06/08/2020    LABVLDL see below 04/05/2019         Assessment   Plan   1. Type 2 diabetes mellitus without complication, without long-term current use of insulin ([facility])  Hemoglobin A1c was 8.8 today. His diabetes is poorly controlled. Counseled patient to work on nutrition and exercise and we will add metformin and follow-up in 3 months with repeat labs  - Lipid Panel; Future  - Comprehensive Metabolic Panel; Future  - POCT glycosylated hemoglobin (Hb A1C)  - metFORMIN (GLUCOPHAGE) 1000 MG tablet; Take 1 tablet by mouth daily (with breakfast)  Dispense: 180 tablet; Refill: 3    2. Primary hypertension  Controlled: Appears stable. We will continue current management and monitor for adverse reaction and disease progression. Follow-up as noted below    - Comprehensive Metabolic Panel; Future    3. Anxiety  Controlled: Appears stable.   We will continue current management and monitor for adverse reaction and disease progression. Follow-up as noted below    - MS BEHAV ASSMT W/SCORE & DOCD/STAND INSTRUMENT  - PARoxetine (PAXIL) 20 MG tablet; TAKE ONE TABLET BY MOUTH DAILY  Dispense: 90 tablet; Refill: 3    4. Need for vaccination  Patient needs pneumococcal 20 vaccine. We are currently out of stock    5. Essential hypertension  As above  - metoprolol succinate (TOPROL XL) 25 MG extended release tablet; Take 1 tablet by mouth daily  Dispense: 90 tablet; Refill: 3        RTC Return in about 3 months (around 9/13/2022).

## 2022-06-13 NOTE — PATIENT INSTRUCTIONS
WELL ADULT LIFESTYLE INSTRUCTIONS:    Chandrakant Roberts a day in the next week to spend an hour reviewing the information below then:     1) determine your health goals for the year   2) determine what changes you need to achieve those goals   3) design your daily routine, shopping habits etc to implement those changes        Default Right Action (no choices)       Make it EASY to do the RIGHT THINGS. 4) I invite you to send me your plans via [employer] so I can continue to help you       with them    Examine your lifestyle with an emphasis on BARRIERS to bad and good habits and how you can design your life to make better choices. If you want to feel better these are the FUNDAMENTAL PILLARS of Wellness:    1)  You can choose to Get 150 min/week of moderate exercise (can talk but can't        sing) or 75 min/week of vigorous exercise (can't talk). This will enhance your sense of well being (Exercise is as good as medicine for   depression.)    2)  You can choose to Get 7-9 hours of sleep per night    Detoxifies your brain, reduces risk of dementia    3)  You can choose to Strength Train 2 x a week on non-consecutive days   This will improve function and reduce risk of injury. Body weight type exercises   such as Yoga and Pilates are excellent choices. 4)  You can choose Good Nutrition. Only eat your goal weight (in lbs) x 10        calories/day and get 5 servings of Vegetables/day   Plant based diets reduce risk of heart attack/stroke and will help you feel full on   less food. Avoid highly processed foods and processed carbohydrates. 5)  You can choose Moderate alcohol intake < 1-2 drinks/day   Alcohol will disrupt your sleep and add calories to your day. 6)  You can choose to Develop a Charismatic/Supportive relationship. This will strengthen your resilience for the ups and downs. 7)  You can choose to Practice Mindfulness. An hour a day of prayer/meditation/gratitude will change your life!     If you are trying to lose weight, here are some recommendations for weight loss:  Not every weight loss program is appropriate for everybody. ..  good online sources include Noom (more social with daily check ins), Lifesum (similar but less social) and Naturally slim, as well as Brandneu ($1500)    The GI Diet or \"Primal diet\", Intermittent fasting can also be effective choices. If you have diabetes treated with insulin be sure to ask for specific guidance around meals. Take your desired weight in pounds and multiply by 10 and that is your average daily calorie allowance. For example if you wish to weigh 170 lb x 10 = 1700 blanquita/day (this is how to gradually lose the weight and maintain your desired weight). Avoid soda/coke and all \"wet carbs\" => Drink ice water instead    Drink a large glass of ice water before meals and EAT SLOWLY (talk while you eat)! Rethink your hunger => it means your losing weight.     Minimize highly processed carbohydrates as they stimulate your appetite:  Specifically cut back on Bread, Rice, Pasta and Potatoes    Avoid eating calories after 6 pm

## 2022-06-14 LAB
A/G RATIO: 1.8 (ref 1.1–2.2)
ALBUMIN SERPL-MCNC: 4.6 G/DL (ref 3.4–5)
ALP BLD-CCNC: 103 U/L (ref 40–129)
ALT SERPL-CCNC: 40 U/L (ref 10–40)
ANION GAP SERPL CALCULATED.3IONS-SCNC: 14 MMOL/L (ref 3–16)
AST SERPL-CCNC: 34 U/L (ref 15–37)
BILIRUB SERPL-MCNC: 0.5 MG/DL (ref 0–1)
BUN BLDV-MCNC: 15 MG/DL (ref 7–20)
CALCIUM SERPL-MCNC: 9.7 MG/DL (ref 8.3–10.6)
CHLORIDE BLD-SCNC: 100 MMOL/L (ref 99–110)
CHOLESTEROL, TOTAL: 165 MG/DL (ref 0–199)
CO2: 25 MMOL/L (ref 21–32)
CREAT SERPL-MCNC: 0.7 MG/DL (ref 0.9–1.3)
GFR AFRICAN AMERICAN: >60
GFR NON-AFRICAN AMERICAN: >60
GLUCOSE BLD-MCNC: 157 MG/DL (ref 70–99)
HDLC SERPL-MCNC: 49 MG/DL (ref 40–60)
LDL CHOLESTEROL CALCULATED: 89 MG/DL
POTASSIUM SERPL-SCNC: 4.3 MMOL/L (ref 3.5–5.1)
SODIUM BLD-SCNC: 139 MMOL/L (ref 136–145)
TOTAL PROTEIN: 7.1 G/DL (ref 6.4–8.2)
TRIGL SERPL-MCNC: 136 MG/DL (ref 0–150)
VLDLC SERPL CALC-MCNC: 27 MG/DL

## 2022-08-18 ENCOUNTER — APPOINTMENT (OUTPATIENT)
Dept: CT IMAGING | Age: 52
End: 2022-08-18
Payer: COMMERCIAL

## 2022-08-18 ENCOUNTER — HOSPITAL ENCOUNTER (EMERGENCY)
Age: 52
Discharge: HOME | End: 2022-08-18
Attending: EMERGENCY MEDICINE
Payer: COMMERCIAL

## 2022-08-18 ENCOUNTER — TELEPHONE (OUTPATIENT)
Dept: ORTHOPEDIC SURGERY | Age: 52
End: 2022-08-18

## 2022-08-18 VITALS
SYSTOLIC BLOOD PRESSURE: 174 MMHG | OXYGEN SATURATION: 96 % | WEIGHT: 240.3 LBS | HEIGHT: 69 IN | TEMPERATURE: 97.8 F | HEART RATE: 77 BPM | RESPIRATION RATE: 16 BRPM | BODY MASS INDEX: 35.59 KG/M2 | DIASTOLIC BLOOD PRESSURE: 92 MMHG

## 2022-08-18 DIAGNOSIS — S76.312A HAMSTRING STRAIN, LEFT, INITIAL ENCOUNTER: Primary | ICD-10-CM

## 2022-08-18 PROCEDURE — 73700 CT LOWER EXTREMITY W/O DYE: CPT

## 2022-08-18 PROCEDURE — 6370000000 HC RX 637 (ALT 250 FOR IP): Performed by: EMERGENCY MEDICINE

## 2022-08-18 PROCEDURE — 99284 EMERGENCY DEPT VISIT MOD MDM: CPT

## 2022-08-18 RX ORDER — IBUPROFEN 400 MG/1
400 TABLET ORAL ONCE
Status: COMPLETED | OUTPATIENT
Start: 2022-08-18 | End: 2022-08-18

## 2022-08-18 RX ADMIN — IBUPROFEN 400 MG: 400 TABLET, FILM COATED ORAL at 12:20

## 2022-08-18 ASSESSMENT — PAIN DESCRIPTION - FREQUENCY: FREQUENCY: CONTINUOUS

## 2022-08-18 ASSESSMENT — ENCOUNTER SYMPTOMS
SHORTNESS OF BREATH: 0
NAUSEA: 0
CHEST TIGHTNESS: 0
ABDOMINAL PAIN: 0
VOMITING: 0
COUGH: 0
DIARRHEA: 0
SORE THROAT: 0
EYES NEGATIVE: 1

## 2022-08-18 ASSESSMENT — PAIN DESCRIPTION - ORIENTATION
ORIENTATION: LEFT
ORIENTATION: LEFT

## 2022-08-18 ASSESSMENT — PAIN - FUNCTIONAL ASSESSMENT: PAIN_FUNCTIONAL_ASSESSMENT: 0-10

## 2022-08-18 ASSESSMENT — PAIN DESCRIPTION - LOCATION
LOCATION: LEG
LOCATION: LEG

## 2022-08-18 ASSESSMENT — PAIN SCALES - GENERAL
PAINLEVEL_OUTOF10: 6
PAINLEVEL_OUTOF10: 5

## 2022-08-18 NOTE — ED TRIAGE NOTES
Pt arrives for eval of left leg pain after pulling it at the gym. Pt sts he can not move it but has positive pulse motion sensation to left foot. Pt is a/ox4, rsp nonlabored and pwd.

## 2022-08-18 NOTE — TELEPHONE ENCOUNTER
General Question     Subject: REQUESTING SOONER APPOINTMENT  Patient and /or Facility Request: Yashira David  Contact Number: [phone]    PATIENT CALLED REQUESTING TO BE SEEN TODAY OR TOMORROW WITH ANY MEMBER OF THR GROUP. PATIENT WENT TO THE ER AND WAS REFERRED TO DR. Justino Jordan. PLEASE CONTACT PATIENT AT THE ABOVE NUMBER IF ANYTHING OPENS UP SOONER.

## 2022-08-18 NOTE — ED PROVIDER NOTES
[address] [facility]      Pt Name: Gianna Betts  MRN: [id number]  [place] 1970  Date ofevaluation: 8/18/2022  Provider: Shana Horvath MD    CHIEF COMPLAINT       Chief Complaint   Patient presents with    Leg Pain         HISTORY OF PRESENT ILLNESS   (Location/Symptom, Timing/Onset,Context/Setting, Quality, Duration, Modifying Factors, Severity)  Note limiting factors. Gianna Betts is a 50 y.o. male  who  has a past medical history of Anxiety, Hypercholesterolemia, Hypertension, Hypertriglyceridemia, and Type 2 diabetes mellitus without complication ([facility] who presents to the emergency department    51-year-old male who presents for acute left leg pain worse in his left hip and left posterior thigh. Patient was working out doing leg raises and calf raises when he felt a pop in the back of his left hip and left hamstring. Having difficulty walking or moving his leg due to pain. No other injury. No numbness. No other associated symptoms. Aching and throbbing in nature. Constant and unchanging. Mild to moderate. Nonradiating. No prior injuries to his legs. No other known risk factors. The history is provided by the patient. No  was used. NursingNotes were reviewed. REVIEW OF SYSTEMS    (2-9 systems for level 4, 10 or more for level 5)     Review of Systems   Constitutional: Negative. Negative for fatigue and fever. HENT:  Negative for congestion and sore throat. Eyes: Negative. Respiratory:  Negative for cough, chest tightness and shortness of breath. Cardiovascular:  Negative for chest pain. Gastrointestinal:  Negative for abdominal pain, diarrhea, nausea and vomiting. Genitourinary: Negative. Musculoskeletal:  Positive for arthralgias. Skin: Negative. Neurological:  Positive for weakness. Negative for dizziness, light-headedness, numbness and headaches.    Hematological: Negative. All other systems reviewed and are negative. Except as noted above the remainder of the review of systems was reviewed and negative. PAST MEDICAL HISTORY     Past Medical History:   Diagnosis Date    Anxiety 3/3/2017    Hypercholesterolemia 2019    Hypertension     Hypertriglyceridemia 2018    [facility]          SURGICALHISTORY       Past Surgical History:   Procedure Laterality Date    APPENDECTOMY      Dr. Kirsten Momin       Previous Medications    EMPAGLIFLOZIN (JARDIANCE) 25 MG TABLET    Take 25 mg by mouth every morning (before breakfast)    METFORMIN (GLUCOPHAGE) 1000 MG TABLET    Take 1 tablet by mouth daily (with breakfast)    METOPROLOL SUCCINATE (TOPROL XL) 25 MG EXTENDED RELEASE TABLET    Take 1 tablet by mouth daily    PAROXETINE (PAXIL) 20 MG TABLET    TAKE ONE TABLET BY MOUTH DAILY            Patient has no known allergies.     FAMILY HISTORY       Family History   Problem Relation Age of Onset    Heart Disease Father     High Blood Pressure Father     Heart Attack Father 47        Fatal    Heart Disease Brother     High Blood Pressure Brother     Hypertension Mother           SOCIAL HISTORY       Social History     Socioeconomic History    Marital status: Single     Spouse name: Javi Mclean    Number of children: 0    Years of education: None    Highest education level: None   Occupational History    Occupation:    Tobacco Use    Smoking status: Former     Packs/day: 1.00     Years: 30.00     Pack years: 30.00     Types: Cigarettes     Quit date: 2016     Years since quittin.6    Smokeless tobacco: Never    Tobacco comments:     quit 6 months ago   Vaping Use    Vaping Use: Never used   Substance and Sexual Activity    Alcohol use: No    Drug use: No     Social Determinants of Health     Financial Resource Strain: Low Risk     Difficulty of Paying Living Expenses: Not hard at all   Food Insecurity: No Food Insecurity    Worried About Running Out of Food in the Last Year: Never true    Ran Out of Food in the Last Year: Never true   Transportation Needs: No Transportation Needs    Lack of Transportation (Medical): No    Lack of Transportation (Non-Medical): No       SCREENINGS    [place] Coma Scale  Eye Opening: Spontaneous  Best Verbal Response: Oriented  Best Motor Response: Obeys commands  [place] Coma Scale Score: 15        PHYSICAL EXAM    (up to 7 for level 4, 8 or more for level 5)     ED Triage Vitals [08/18/22 1155]   BP Temp Temp src Heart Rate Resp SpO2 Height Weight   (!) 174/92 97.8 °F (36.6 °C) -- 77 16 96 % 5' 9\" (1.753 m) 240 lb 4.8 oz (109 kg)       Physical Exam  Vitals and nursing note reviewed. Constitutional:       General: He is not in acute distress. Appearance: Normal appearance. He is well-developed and normal weight. He is not ill-appearing, toxic-appearing or diaphoretic. HENT:      Head: Normocephalic and atraumatic. Mouth/Throat:      Mouth: Mucous membranes are moist.      Pharynx: Oropharynx is clear. Eyes:      Extraocular Movements: Extraocular movements intact. Cardiovascular:      Rate and Rhythm: Normal rate and regular rhythm. Pulses: Normal pulses. Pulmonary:      Effort: Pulmonary effort is normal.      Breath sounds: Normal breath sounds. No decreased breath sounds. Abdominal:      Palpations: Abdomen is soft. Tenderness: There is no abdominal tenderness. Musculoskeletal:      Cervical back: Normal range of motion and neck supple. Right hip: Normal.      Left hip: Normal.      Right upper leg: Normal.      Left upper leg: Tenderness present. No swelling, edema or deformity. Right knee: No swelling, deformity, effusion, erythema, ecchymosis, lacerations or bony tenderness. Normal range of motion. No tenderness.       Left knee: No swelling, deformity, effusion, erythema, ecchymosis, lacerations or bony tenderness. Normal range of motion. No tenderness. No LCL laxity, MCL laxity, ACL laxity or PCL laxity. Instability Tests: Anterior drawer test negative. Posterior drawer test negative. Right lower leg: No swelling, deformity, tenderness or bony tenderness. No edema. Left lower leg: No swelling, deformity, tenderness or bony tenderness. No edema. Right ankle: No swelling, deformity or lacerations. No tenderness. Normal range of motion. Normal pulse. Left ankle: No swelling, deformity or lacerations. No tenderness. Normal range of motion. Normal pulse. Left Achilles Tendon: No tenderness. Phipps's test negative. Right foot: Normal range of motion and normal capillary refill. No swelling, deformity, tenderness or bony tenderness. Normal pulse. Left foot: Normal range of motion and normal capillary refill. No swelling, deformity, tenderness or bony tenderness. Normal pulse. Comments: Tenderness to palpation of left hamstring with no obvious deformity swelling bruising    Tenderness and pain with movement of extension at the hip and flexion at the knee but flicker of muscles is present   Skin:     General: Skin is warm and dry. Capillary Refill: Capillary refill takes less than 2 seconds. Neurological:      General: No focal deficit present. Mental Status: He is alert. Psychiatric:         Mood and Affect: Mood normal.       RESULTS     EKG: All EKG's are interpreted by the Emergency Department Physician who either signs or Co-signsthis chart in the absence of a cardiologist.      RADIOLOGY:   Levorn Clement such as CT, Ultrasound and MRI are read by the radiologist.   Interpretation per the Radiologist below, if available at the time ofthis note:    CT HIP LEFT WO CONTRAST   Final Result   1. No acute osseous findings. 2.  No evidence of inflammatory change or edema involving the hamstring   muscles. 3.  Fat containing left inguinal hernia. 4. Degenerative facet changes at L4/5 and L5/S1. ED BEDSIDE ULTRASOUND:   Performed by ED Physician - none    LABS:  Labs Reviewed - No data to display    All other labs were within normal range or not returned as of this dictation. EMERGENCY DEPARTMENT COURSE and DIFFERENTIAL DIAGNOSIS/MDM:   Vitals:    Vitals:    08/18/22 1155   BP: (!) 174/92   Pulse: 77   Resp: 16   Temp: 97.8 °F (36.6 °C)   SpO2: 96%   Weight: 240 lb 4.8 oz (109 kg)   Height: 5' 9\" (1.753 m)       Patient was given thefollowing medications:  Medications   ibuprofen (ADVIL;MOTRIN) tablet 400 mg (400 mg Oral Given 8/18/22 1220)       ED COURSE & MEDICAL DECISION MAKING    Pertinent Labs & Imaging studies reviewed. (See chart for details)   -  Patient seen and evaluated in the emergency department. -  Triage and nursing notes reviewed and incorporated. -  Old chart records reviewed and incorporated. -  Differential diagnosis includes: Differential diagnosis: Muscular strain, muscular tear fracture, dislocation, sprain, vascular injury, neurologic injury, tendon injury, laceration, other    51-year-old male who presents for left hamstring and hip pain with significant pain with extension at the hip and flexion at the knee likely due to muscular strain or partial tear. Do not believe the patient has a complete tear as he still has motion. He is neurovascularly intact normal sensation and normal pulses in bilateral lower extremities. No other signs of injury. Will obtain CT scan of the hip with extension into the femur. Otherwise will give patient crutches for comfort and instructions of follow-up with orthopedics. -  Work-up included:  See above  -  ED treatment included: See above  -  Results discussed with patient. The patient is agreeable with plan of care and disposition. Is this patient to be included in the SEP-1 Core Measure due to severe sepsis or septic shock?    No   Exclusion criteria - the patient is NOT to be included for SEP-1 Core Measure due to: Infection is not suspected     REASSESSMENT     ED Course as of 08/18/22 1333   Thu Aug 18, 2022   1328 CT scan unremarkable. Will give patient crutches for comfort and instructions to follow-up with orthopedics. [SC]      ED Course User Index  [SC] Keaton Arredondo MD     I estimate there is LOW risk for COMPARTMENT SYNDROME, DEEP VENOUS THROMBOSIS, SEPTIC ARTHRITIS, TENDON OR NEUROVASCULAR INJURY, thus I consider the discharge disposition reasonable. The patient is at low risk for mortality based on demographic, history and clinical factors. Given the best available information and clinical assessment, I estimate the risk of hospitalization to be greater than risk of treatment at home. I have explained to the patient that the risk could rapidly change, given precautions for return and instructions. Explained to patient that the risk for mortality is low based on demographic, history and clinical factors. I discussed with patient the results of evaluation in the ED, diagnosis, care, and prognosis. The plan is to discharge to home. Patient is in agreement with plan and questions have been answered. I also discussed with patient the reasons which may require a return visit and the importance of follow-up care. The patient is well-appearing, nontoxic, and improved at the time of discharge. Patient agrees to call to arrange follow-up care as directed. Patient understands to return immediately for worsening/change in symptoms. CRITICAL CARE TIME   Total Critical Care time was 0 minutes, excluding separately reportable procedures. There was a high probability of clinically significant/life threatening deterioration in the patient's condition which required my urgent intervention.       This includes multiple reevaluations, vital sign monitoring, pulse oximetry monitoring, telemetry monitoring, clinical response to the IV medications, reviewing the nursing notes, consultation time, dictation/documentation time, and interpretation of the labwork. (This time excludes time spent performing procedures). CONSULTS:  None    PROCEDURES:  Unless otherwise noted below, none     Procedures    FINAL IMPRESSION      1.  Hamstring strain, left, initial encounter          DISPOSITION/PLAN   DISPOSITION Decision To Discharge 08/18/2022 01:32:05 PM      PATIENT REFERREDTO:  Jude Dean MD  [address] [place] [address]  [phone]    Schedule an appointment as soon as possible for a visit       DISCHARGEMEDICATIONS:  New Prescriptions    No medications on file          (Please note that portions of this note were completed with a voice recognition program.  Efforts were made to edit the dictations but occasionally words are mis-transcribed.)    Cyndi Newsome MD (electronically signed)  Attending Emergency Physician         Cyndi Newsome MD  08/18/22 0901

## 2022-08-19 ENCOUNTER — OFFICE VISIT (OUTPATIENT)
Dept: ORTHOPEDIC SURGERY | Age: 52
End: 2022-08-19
Payer: COMMERCIAL

## 2022-08-19 VITALS — RESPIRATION RATE: 16 BRPM | HEIGHT: 69 IN | BODY MASS INDEX: 36.35 KG/M2 | WEIGHT: 245.4 LBS

## 2022-08-19 DIAGNOSIS — S76.312A STRAIN OF LEFT HAMSTRING MUSCLE, INITIAL ENCOUNTER: Primary | ICD-10-CM

## 2022-08-19 PROCEDURE — 1036F TOBACCO NON-USER: CPT | Performed by: ORTHOPAEDIC SURGERY

## 2022-08-19 PROCEDURE — G8427 DOCREV CUR MEDS BY ELIG CLIN: HCPCS | Performed by: ORTHOPAEDIC SURGERY

## 2022-08-19 PROCEDURE — 3017F COLORECTAL CA SCREEN DOC REV: CPT | Performed by: ORTHOPAEDIC SURGERY

## 2022-08-19 PROCEDURE — 99203 OFFICE O/P NEW LOW 30 MIN: CPT | Performed by: ORTHOPAEDIC SURGERY

## 2022-08-19 PROCEDURE — G8417 CALC BMI ABV UP PARAM F/U: HCPCS | Performed by: ORTHOPAEDIC SURGERY

## 2022-08-19 RX ORDER — METHYLPREDNISOLONE 4 MG/1
TABLET ORAL
Qty: 1 KIT | Refills: 0 | Status: CANCELLED | OUTPATIENT
Start: 2022-08-19

## 2022-08-19 RX ORDER — METHYLPREDNISOLONE 4 MG/1
TABLET ORAL
Qty: 1 KIT | Refills: 0 | Status: SHIPPED | OUTPATIENT
Start: 2022-08-19 | End: 2022-09-19

## 2022-08-19 NOTE — LETTER
[facility]  [address]  [address]  [address]  Phone: [phone]  Fax: [phone]    Jax Fairbanks MD        August 19, 2022     Patient: Brayan Harrington   YOB: 1970   Date of Visit: 8/19/2022       To Whom It May Concern: It is my medical opinion that Atilio Bhatia missed work on 8-18-22 and may return today 8-19-22. If you have any questions or concerns, please don't hesitate to call.     Sincerely,          Jax Fairbanks MD

## 2022-08-19 NOTE — PROGRESS NOTES
ORTHOPAEDIC NEW PATIENT NOTE    Chief Complaint   Patient presents with    Leg Injury     Left leg          HPI  8/19/22  50 y.o. male seen for evaluation of left thigh/hamstring injury:  Onset yesterday  Injury/trauma - doing 170 lbs leg curls when he felt a pop in the left posterior thigh   History of symptoms denies  Pain is located left posterior thigh  Worse with WB, knee flexion  Better with rest  Associated with N/A   No obvious bruising seen  Works out 4x/week    Review of Systems  I have read over the ROS from the Patient History Form dated on 8/19/22  Pertinent positives include none listed  Rest of 13 point ROS otherwise negative except per HPI, and scanned into the patient's chart under the Media tab. No Known Allergies     Current Outpatient Medications   Medication Sig Dispense Refill    methylPREDNISolone (MEDROL, REBECCA,) 4 MG tablet Take by mouth as directed on kit 1 kit 0    PARoxetine (PAXIL) 20 MG tablet TAKE ONE TABLET BY MOUTH DAILY 90 tablet 3    metoprolol succinate (TOPROL XL) 25 MG extended release tablet Take 1 tablet by mouth daily 90 tablet 3    metFORMIN (GLUCOPHAGE) 1000 MG tablet Take 1 tablet by mouth daily (with breakfast) 180 tablet 3    empagliflozin (JARDIANCE) 25 MG tablet Take 25 mg by mouth every morning (before breakfast) 90 tablet 3     No current facility-administered medications for this visit.        Past Medical History:   Diagnosis Date    Anxiety 3/3/2017    Hypercholesterolemia 1/4/2019    Hypertension     Hypertriglyceridemia 6/8/2018    [facility] 7/55/0277        Past Surgical History:   Procedure Laterality Date    APPENDECTOMY  2016    Dr. Tony Paez       Family History   Problem Relation Age of Onset    Heart Disease Father     High Blood Pressure Father     Heart Attack Father 47        Fatal    Heart Disease Brother     High Blood Pressure Brother     Hypertension Mother        Social History     Socioeconomic History Marital status: Single     Spouse name: Zachery Isidro    Number of children: 0    Years of education: Not on file    Highest education level: Not on file   Occupational History    Occupation:    Tobacco Use    Smoking status: Former     Packs/day: 1.00     Years: 30.00     Pack years: 30.00     Types: Cigarettes     Quit date:      Years since quittin.6    Smokeless tobacco: Never    Tobacco comments:     quit 6 months ago   Vaping Use    Vaping Use: Never used   Substance and Sexual Activity    Alcohol use: No    Drug use: No    Sexual activity: Not on file   Other Topics Concern    Not on file   Social History Narrative    Not on file     Social Determinants of Health     Financial Resource Strain: Low Risk     Difficulty of Paying Living Expenses: Not hard at all   Food Insecurity: No Food Insecurity    Worried About [employer] in the Last Year: Never true    [address] [employer] in the Last Year: Never true   Transportation Needs: No Transportation Needs    Lack of Transportation (Medical): No    Lack of Transportation (Non-Medical): No   Physical Activity: Not on file   Stress: Not on file   Social Connections: Not on file   Intimate Partner Violence: Not on file   Housing Stability: Not on file        Vitals:    22 0808   Resp: 16   Weight: 245 lb 6.4 oz (111.3 kg)   Height: 5' 9\" (1.753 m)       Physical Exam  Constitutional - well-groomed, well-nourished, Body mass index is 36.24 kg/m².   Psychiatric - pleasant, normal mood & affect  Cardiovascular - RRR, equivocal peripheral edema, posterior tibialis pulse 2+  Respiratory - respirations unlabored, on room air; mask on  Skin - no rashes, wounds, or lesions seen on exposed skin  Neurological - LLE SILT SP/DP/T/sural/saphenous nerve distributions; EHL/FHL/TA/GS intact  Left thigh - no obvious swelling, ecchymosis              No defect with palpation of the posterior thigh, including at the ischial tuberosity Focally tender to palpation of the mid posterior thigh region   No TTP ischial tuberosity              Pain with resisted knee flexion      Imaging:  Images were personally reviewed by myself and discussed with the patient  Narrative   EXAMINATION:   CT OF THE LEFT HIP WITHOUT CONTRAST 8/18/2022 12:10 pm       TECHNIQUE:   CT of the left hip was performed without the administration of intravenous   contrast.  Multiplanar reformatted images are provided for review. Automated   exposure control, iterative reconstruction, and/or weight based adjustment of   the mA/kV was utilized to reduce the radiation dose to as low as reasonably   achievable. COMPARISON:   CT abdomen/pelvis dated 23 July 2016. HISTORY   ORDERING SYSTEM PROVIDED HISTORY: Left hip and hamgstring pain, diffculty   with extension at the hip and flexion at the knee. Possible hamgstring tear   TECHNOLOGIST PROVIDED HISTORY:   Continue through mid femur please   Reason for exam:->Left hip and hamgstring pain, diffculty with extension at   the hip and flexion at the knee. Possible hamgstring tear   Decision Support Exception - unselect if not a suspected or confirmed   emergency medical condition->Emergency Medical Condition (MA)   Reason for Exam: Left hip and hamgstring pain, diffculty with extension at   the hip and flexion at the knee. Possib       FINDINGS:   Bones: There is intervertebral facet arthropathy at L4/5 and L5/S1 which   appears similar to the comparison exam.  There is mild degenerative change of   the superolateral acetabular roof on the left. Hip joint spaces are grossly   preserved. No evidence of osteonecrosis. No fracture or suspicious osseous   lesion. Soft Tissue: Fat containing left inguinal hernia. No soft tissue fluid   collections or evidence of inflammatory fat stranding. Specifically no   evidence of edema or inflammatory change surrounding the hamstring muscles. Impression   1.   No acute osseous findings. 2.  No evidence of inflammatory change or edema involving the hamstring   muscles. 3.  Fat containing left inguinal hernia. 4.  Degenerative facet changes at L4/5 and L5/S1. Left femur 4 views performed 8/19/22 - no acute osseous abnormalities. Baseline mild to moderate left hip and knee degenerative changes are seen. Assessment & Plan:  50 y.o. male who presents with    Diagnosis Orders   1. Strain of left hamstring muscle, initial encounter  XR FEMUR LEFT (MIN 2 VIEWS)    methylPREDNISolone (MEDROL, REBECCA,) 4 MG tablet          No orders of the defined types were placed in this encounter.     Pathoanatomy discussed  Appears to be an intramuscular strain  Hamstring origin at the ischial tuberosity appears intact, no defect with palpation and no TTP there  Recommend conservative care  Ice, Tylenol/NSAIDs, topical patches such as Salonpas  Protected weightbearing as needed  Symptoms should gradually improve/resolve with more time  Medrol Dosepak prescribed if needed  Once acute symptoms resolve, consider physical therapy for stretching regimen and maintenance program    Malissa Douglas MD

## 2022-09-04 DIAGNOSIS — I10 ESSENTIAL HYPERTENSION: ICD-10-CM

## 2022-09-04 DIAGNOSIS — E78.00 HYPERCHOLESTEROLEMIA: ICD-10-CM

## 2022-09-06 RX ORDER — HYDROCHLOROTHIAZIDE 12.5 MG/1
CAPSULE, GELATIN COATED ORAL
Qty: 90 CAPSULE | Refills: 3 | Status: SHIPPED | OUTPATIENT
Start: 2022-09-06

## 2022-09-06 RX ORDER — ATORVASTATIN CALCIUM 40 MG/1
TABLET, FILM COATED ORAL
Qty: 90 TABLET | Refills: 3 | Status: SHIPPED | OUTPATIENT
Start: 2022-09-06

## 2022-09-06 NOTE — TELEPHONE ENCOUNTER
Medication:   Requested Prescriptions     Pending Prescriptions Disp Refills    hydroCHLOROthiazide (MICROZIDE) 12.5 MG capsule [Pharmacy Med Name: hydroCHLOROthiazide 12.5 MG CAPSULE] 90 capsule 3     Sig: TAKE ONE CAPSULE BY MOUTH EVERY MORNING    atorvastatin (LIPITOR) 40 MG tablet [Pharmacy Med Name: ATORVASTATIN 40 MG TABLET] 90 tablet 3     Sig: TAKE ONE TABLET BY MOUTH ONCE NIGHTLY       Last Filled:      Patient Phone Number: [phone] (home) [phone] (work)    Last appt: 6/13/2022   Next appt: 9/19/2022  Return in about 3 months (around 9/13/2022).   Last PSA: No results found for: PSA

## 2022-09-19 ENCOUNTER — OFFICE VISIT (OUTPATIENT)
Dept: PRIMARY CARE CLINIC | Age: 52
End: 2022-09-19
Payer: COMMERCIAL

## 2022-09-19 VITALS
BODY MASS INDEX: 35.96 KG/M2 | SYSTOLIC BLOOD PRESSURE: 126 MMHG | WEIGHT: 242.8 LBS | HEART RATE: 75 BPM | DIASTOLIC BLOOD PRESSURE: 80 MMHG | TEMPERATURE: 97.2 F | HEIGHT: 69 IN | OXYGEN SATURATION: 95 %

## 2022-09-19 DIAGNOSIS — Z23 NEED FOR VACCINATION: ICD-10-CM

## 2022-09-19 DIAGNOSIS — E11.9 TYPE 2 DIABETES MELLITUS WITHOUT COMPLICATION, WITHOUT LONG-TERM CURRENT USE OF INSULIN (HCC): Primary | ICD-10-CM

## 2022-09-19 DIAGNOSIS — Z12.11 SCREEN FOR COLON CANCER: ICD-10-CM

## 2022-09-19 DIAGNOSIS — F41.9 ANXIETY: ICD-10-CM

## 2022-09-19 DIAGNOSIS — S76.312A STRAIN OF LEFT HAMSTRING MUSCLE, INITIAL ENCOUNTER: ICD-10-CM

## 2022-09-19 DIAGNOSIS — I10 ESSENTIAL HYPERTENSION: ICD-10-CM

## 2022-09-19 DIAGNOSIS — E78.00 HYPERCHOLESTEROLEMIA: ICD-10-CM

## 2022-09-19 LAB — HBA1C MFR BLD: 8.7 %

## 2022-09-19 PROCEDURE — 96127 BRIEF EMOTIONAL/BEHAV ASSMT: CPT | Performed by: FAMILY MEDICINE

## 2022-09-19 PROCEDURE — G8427 DOCREV CUR MEDS BY ELIG CLIN: HCPCS | Performed by: FAMILY MEDICINE

## 2022-09-19 PROCEDURE — 1036F TOBACCO NON-USER: CPT | Performed by: FAMILY MEDICINE

## 2022-09-19 PROCEDURE — 83036 HEMOGLOBIN GLYCOSYLATED A1C: CPT | Performed by: FAMILY MEDICINE

## 2022-09-19 PROCEDURE — 3052F HG A1C>EQUAL 8.0%<EQUAL 9.0%: CPT | Performed by: FAMILY MEDICINE

## 2022-09-19 PROCEDURE — G8417 CALC BMI ABV UP PARAM F/U: HCPCS | Performed by: FAMILY MEDICINE

## 2022-09-19 PROCEDURE — 99214 OFFICE O/P EST MOD 30 MIN: CPT | Performed by: FAMILY MEDICINE

## 2022-09-19 PROCEDURE — 2022F DILAT RTA XM EVC RTNOPTHY: CPT | Performed by: FAMILY MEDICINE

## 2022-09-19 PROCEDURE — 90686 IIV4 VACC NO PRSV 0.5 ML IM: CPT | Performed by: FAMILY MEDICINE

## 2022-09-19 PROCEDURE — 90677 PCV20 VACCINE IM: CPT | Performed by: FAMILY MEDICINE

## 2022-09-19 PROCEDURE — 3017F COLORECTAL CA SCREEN DOC REV: CPT | Performed by: FAMILY MEDICINE

## 2022-09-19 ASSESSMENT — ANXIETY QUESTIONNAIRES
GAD7 TOTAL SCORE: 1
5. BEING SO RESTLESS THAT IT IS HARD TO SIT STILL: 0
IF YOU CHECKED OFF ANY PROBLEMS ON THIS QUESTIONNAIRE, HOW DIFFICULT HAVE THESE PROBLEMS MADE IT FOR YOU TO DO YOUR WORK, TAKE CARE OF THINGS AT HOME, OR GET ALONG WITH OTHER PEOPLE: NOT DIFFICULT AT ALL
1. FEELING NERVOUS, ANXIOUS, OR ON EDGE: 0
3. WORRYING TOO MUCH ABOUT DIFFERENT THINGS: 0
2. NOT BEING ABLE TO STOP OR CONTROL WORRYING: 0
6. BECOMING EASILY ANNOYED OR IRRITABLE: 0
7. FEELING AFRAID AS IF SOMETHING AWFUL MIGHT HAPPEN: 0
4. TROUBLE RELAXING: 1

## 2022-09-19 NOTE — PATIENT INSTRUCTIONS
Aníbal Corona: Dr. Dayo Braun  [address] [place] [address]  Phone: [phone]  Fax: [phone]\"

## 2022-09-19 NOTE — PROGRESS NOTES
3    hydroCHLOROthiazide (MICROZIDE) 12.5 MG capsule TAKE ONE CAPSULE BY MOUTH EVERY MORNING 90 capsule 3    atorvastatin (LIPITOR) 40 MG tablet TAKE ONE TABLET BY MOUTH ONCE NIGHTLY 90 tablet 3    PARoxetine (PAXIL) 20 MG tablet TAKE ONE TABLET BY MOUTH DAILY 90 tablet 3    metoprolol succinate (TOPROL XL) 25 MG extended release tablet Take 1 tablet by mouth daily 90 tablet 3    empagliflozin (JARDIANCE) 25 MG tablet Take 25 mg by mouth every morning (before breakfast) 90 tablet 3     No current facility-administered medications for this visit. Past Medical History:   Diagnosis Date    Anxiety 3/3/2017    Hypercholesterolemia 1/4/2019    Hypertension     Hypertriglyceridemia 6/8/2018    Type 2 diabetes mellitus without complication ([facility] 5/12/0241         Objective   /80   Pulse 75   Temp 97.2 °F (36.2 °C)   Ht 5' 9\" (1.753 m)   Wt 242 lb 12.8 oz (110.1 kg)   SpO2 95%   BMI 35.86 kg/m²   Wt Readings from Last 3 Encounters:   09/19/22 242 lb 12.8 oz (110.1 kg)   08/19/22 245 lb 6.4 oz (111.3 kg)   08/18/22 240 lb 4.8 oz (109 kg)       Physical Exam  Constitutional:       Appearance: He is well-developed. Cardiovascular:      Rate and Rhythm: Normal rate and regular rhythm. Pulses:           Dorsalis pedis pulses are 2+ on the right side and 2+ on the left side. Posterior tibial pulses are 2+ on the right side and 2+ on the left side. Heart sounds: No murmur heard. No friction rub. No gallop. Comments: No Lower Extremity Edema  Pulmonary:      Effort: Pulmonary effort is normal.      Breath sounds: Normal breath sounds. No wheezing or rales. Abdominal:      General: Bowel sounds are normal. There is no distension. Palpations: Abdomen is soft. There is no mass. Tenderness: There is no abdominal tenderness. Skin:     General: Skin is warm and dry. Findings: No rash.          Chemistry        Component Value Date/Time     06/13/2022 1155    K 4.3 06/13/2022 1155     06/13/2022 1155    CO2 25 06/13/2022 1155    BUN 15 06/13/2022 1155    CREATININE 0.7 (L) 06/13/2022 1155        Component Value Date/Time    CALCIUM 9.7 06/13/2022 1155    ALKPHOS 103 06/13/2022 1155    AST 34 06/13/2022 1155    ALT 40 06/13/2022 1155    BILITOT 0.5 06/13/2022 1155          Lab Results   Component Value Date    WBC 9.6 07/29/2016    HGB 14.5 07/29/2016    HCT 42.6 07/29/2016    MCV 93.6 07/29/2016     07/29/2016     Lab Results   Component Value Date    LABA1C 8.7 09/19/2022     Lab Results   Component Value Date    .6 12/15/2020     Lab Results   Component Value Date    LABA1C 8.7 09/19/2022     No components found for: CHLPL  Lab Results   Component Value Date    TRIG 136 06/13/2022    TRIG 330 (H) 06/15/2021    TRIG 233 (H) 06/08/2020     Lab Results   Component Value Date    HDL 49 06/13/2022    HDL 40 06/15/2021    HDL 46 06/08/2020     Lab Results   Component Value Date    LDLCALC 89 06/13/2022    LDLCALC see below 06/15/2021    LDLCALC 49 06/08/2020     Lab Results   Component Value Date    LABVLDL 27 06/13/2022    LABVLDL see below 06/15/2021    LABVLDL 47 06/08/2020         Assessment   Plan   1. Type 2 diabetes mellitus without complication, without long-term current use of insulin ([facility])  Hemoglobin A1c was 8.7 today. This is modest improvement compared to last.  We will titrate up his metformin and recheck in 3 months.  - POCT glycosylated hemoglobin (Hb A1C)  - Microalbumin / Creatinine Urine Ratio; Future  - metFORMIN (GLUCOPHAGE) 1000 MG tablet; Take 1 tablet by mouth 2 times daily (with meals)  Dispense: 180 tablet; Refill: 3    2. Hypercholesterolemia  Controlled: Appears stable. We will continue current management and monitor for adverse reaction and disease progression. Follow-up as noted below      3. Essential hypertension  Controlled: Appears stable.   We will continue current management and monitor for adverse reaction and disease progression. Follow-up as noted below      4. Anxiety  Controlled: Appears stable. We will continue current management and monitor for adverse reaction and disease progression. Follow-up as noted below    - AR BEHAV ASSMT W/SCORE & DOCD/STAND INSTRUMENT    5. Need for vaccination    Vaccinated  - Pneumococcal, PCV20, PREVNAR 21, (age 25 yrs+), IM, PF  - Influenza, AFLURIA QUADV, (age 1 yrs+), IM, PF, 0.5mL    6. Screen for colon cancer  Referred for screening  - AFL - Merly Mccarthy MD, Gastroenterology, [facility]    7. Strain of left hamstring muscle, initial encounter  Counseled eccentric home exercise program for rehabilitation follow-up in 3 months sooner if worse        RTC Return in about 3 months (around 12/19/2022).

## 2022-12-19 ENCOUNTER — OFFICE VISIT (OUTPATIENT)
Dept: PRIMARY CARE CLINIC | Age: 52
End: 2022-12-19
Payer: COMMERCIAL

## 2022-12-19 VITALS
WEIGHT: 243.2 LBS | DIASTOLIC BLOOD PRESSURE: 83 MMHG | BODY MASS INDEX: 35.91 KG/M2 | HEART RATE: 77 BPM | SYSTOLIC BLOOD PRESSURE: 125 MMHG

## 2022-12-19 DIAGNOSIS — E78.00 HYPERCHOLESTEROLEMIA: ICD-10-CM

## 2022-12-19 DIAGNOSIS — E11.9 TYPE 2 DIABETES MELLITUS WITHOUT COMPLICATION, WITHOUT LONG-TERM CURRENT USE OF INSULIN (HCC): Primary | ICD-10-CM

## 2022-12-19 DIAGNOSIS — E11.9 TYPE 2 DIABETES MELLITUS WITHOUT COMPLICATION, WITHOUT LONG-TERM CURRENT USE OF INSULIN (HCC): ICD-10-CM

## 2022-12-19 DIAGNOSIS — I10 PRIMARY HYPERTENSION: ICD-10-CM

## 2022-12-19 DIAGNOSIS — F41.9 ANXIETY: ICD-10-CM

## 2022-12-19 DIAGNOSIS — Z12.11 SCREEN FOR COLON CANCER: ICD-10-CM

## 2022-12-19 LAB
A/G RATIO: 2 (ref 1.1–2.2)
ALBUMIN SERPL-MCNC: 4.8 G/DL (ref 3.4–5)
ALP BLD-CCNC: 120 U/L (ref 40–129)
ALT SERPL-CCNC: 59 U/L (ref 10–40)
ANION GAP SERPL CALCULATED.3IONS-SCNC: 12 MMOL/L (ref 3–16)
AST SERPL-CCNC: 32 U/L (ref 15–37)
BILIRUB SERPL-MCNC: 0.4 MG/DL (ref 0–1)
BUN BLDV-MCNC: 17 MG/DL (ref 7–20)
CALCIUM SERPL-MCNC: 9.8 MG/DL (ref 8.3–10.6)
CHLORIDE BLD-SCNC: 100 MMOL/L (ref 99–110)
CO2: 27 MMOL/L (ref 21–32)
CREAT SERPL-MCNC: 0.6 MG/DL (ref 0.9–1.3)
CREATININE URINE: 158.2 MG/DL (ref 39–259)
GFR SERPL CREATININE-BSD FRML MDRD: >60 ML/MIN/{1.73_M2}
GLUCOSE BLD-MCNC: 268 MG/DL (ref 70–99)
HBA1C MFR BLD: 9.1 %
MICROALBUMIN UR-MCNC: 2.5 MG/DL
MICROALBUMIN/CREAT UR-RTO: 15.8 MG/G (ref 0–30)
POTASSIUM SERPL-SCNC: 4.5 MMOL/L (ref 3.5–5.1)
SODIUM BLD-SCNC: 139 MMOL/L (ref 136–145)
TOTAL PROTEIN: 7.2 G/DL (ref 6.4–8.2)

## 2022-12-19 PROCEDURE — G8482 FLU IMMUNIZE ORDER/ADMIN: HCPCS | Performed by: FAMILY MEDICINE

## 2022-12-19 PROCEDURE — 3046F HEMOGLOBIN A1C LEVEL >9.0%: CPT | Performed by: FAMILY MEDICINE

## 2022-12-19 PROCEDURE — 3078F DIAST BP <80 MM HG: CPT | Performed by: FAMILY MEDICINE

## 2022-12-19 PROCEDURE — 83036 HEMOGLOBIN GLYCOSYLATED A1C: CPT | Performed by: FAMILY MEDICINE

## 2022-12-19 PROCEDURE — G8417 CALC BMI ABV UP PARAM F/U: HCPCS | Performed by: FAMILY MEDICINE

## 2022-12-19 PROCEDURE — 3074F SYST BP LT 130 MM HG: CPT | Performed by: FAMILY MEDICINE

## 2022-12-19 PROCEDURE — 99214 OFFICE O/P EST MOD 30 MIN: CPT | Performed by: FAMILY MEDICINE

## 2022-12-19 PROCEDURE — G8427 DOCREV CUR MEDS BY ELIG CLIN: HCPCS | Performed by: FAMILY MEDICINE

## 2022-12-19 PROCEDURE — 2022F DILAT RTA XM EVC RTNOPTHY: CPT | Performed by: FAMILY MEDICINE

## 2022-12-19 PROCEDURE — 3017F COLORECTAL CA SCREEN DOC REV: CPT | Performed by: FAMILY MEDICINE

## 2022-12-19 PROCEDURE — 1036F TOBACCO NON-USER: CPT | Performed by: FAMILY MEDICINE

## 2022-12-19 RX ORDER — GLIPIZIDE 5 MG/1
5 TABLET, FILM COATED, EXTENDED RELEASE ORAL DAILY
Qty: 90 TABLET | Refills: 3 | Status: SHIPPED | OUTPATIENT
Start: 2022-12-19 | End: 2023-12-19

## 2022-12-19 ASSESSMENT — PATIENT HEALTH QUESTIONNAIRE - PHQ9
SUM OF ALL RESPONSES TO PHQ9 QUESTIONS 1 & 2: 0
2. FEELING DOWN, DEPRESSED OR HOPELESS: 0
SUM OF ALL RESPONSES TO PHQ QUESTIONS 1-9: 0
1. LITTLE INTEREST OR PLEASURE IN DOING THINGS: 0
SUM OF ALL RESPONSES TO PHQ QUESTIONS 1-9: 0

## 2022-12-19 NOTE — RESULT ENCOUNTER NOTE
Good Evening Luis Miguel Dotson ,    I wanted to let you know that your labs looked okay. Yg Scott Keep up the good work!     Have a great week,    Dr. Kandi Pino      For your convenience I have listed your future appointment(s) below:  Future Appointments  1/30/2023  11:00 AM   MD AVERY Pleitez

## 2022-12-19 NOTE — PROGRESS NOTES
Chief Complaint   Patient presents with    Diabetes    Hypertension    Hyperlipidemia    Anxiety       HPI: MELVA  presents for evaluation and management of diabetes, hypertension, hypercholesterolemia and anxiety. Sondra Sanford reports he is taking his diabetes medications with the exception of metformin which she is only taking once a day instead of twice a day. States he thought it made him feel like he had low energy. He denied diarrhea or nausea or vomiting however. He notes he is taking and tolerating his blood pressure medicine without lightheadedness or dizziness. He is compliant with his cholesterol medicine without abdominal pain or myalgia. He reports he is under increased social stressors with his niece who is living with her grandparents but anxiety is fairly well controlled with Paxil.     Today's PHQ:    PHQ Scores 12/19/2022 6/13/2022 3/15/2021 12/15/2020 6/8/2020 7/15/2019 4/5/2019   PHQ2 Score 0 0 0 0 0 0 0   PHQ9 Score 0 2 0 1 0 0 0     Interpretation of Total Score Depression Severity: 1-4 = Minimal depression, 5-9 = Mild depression, 10-14 = Moderate depression, 15-19 = Moderately severe depression, 20-27 = Severe depression        Review of Systems    No Known Allergies  New Prescriptions    GLIPIZIDE (GLUCOTROL XL) 5 MG EXTENDED RELEASE TABLET    Take 1 tablet by mouth daily     Current Outpatient Medications   Medication Sig Dispense Refill    glipiZIDE (GLUCOTROL XL) 5 MG extended release tablet Take 1 tablet by mouth daily 90 tablet 3    metFORMIN (GLUCOPHAGE) 1000 MG tablet Take 1 tablet by mouth 2 times daily (with meals) 180 tablet 3    hydroCHLOROthiazide (MICROZIDE) 12.5 MG capsule TAKE ONE CAPSULE BY MOUTH EVERY MORNING 90 capsule 3    atorvastatin (LIPITOR) 40 MG tablet TAKE ONE TABLET BY MOUTH ONCE NIGHTLY 90 tablet 3    PARoxetine (PAXIL) 20 MG tablet TAKE ONE TABLET BY MOUTH DAILY 90 tablet 3    metoprolol succinate (TOPROL XL) 25 MG extended release tablet Take 1 tablet by mouth daily 90 tablet 3    empagliflozin (JARDIANCE) 25 MG tablet Take 25 mg by mouth every morning (before breakfast) 90 tablet 3     No current facility-administered medications for this visit. Past Medical History:   Diagnosis Date    Anxiety 3/3/2017    Hypercholesterolemia 1/4/2019    Hypertension     Hypertriglyceridemia 6/8/2018    Type 2 diabetes mellitus without complication ([facility] 2/06/2529         Objective   /83   Pulse 77   Wt 243 lb 3.2 oz (110.3 kg)   BMI 35.91 kg/m²   Wt Readings from Last 3 Encounters:   12/19/22 243 lb 3.2 oz (110.3 kg)   09/19/22 242 lb 12.8 oz (110.1 kg)   08/19/22 245 lb 6.4 oz (111.3 kg)       Physical Exam  Constitutional:       Appearance: He is well-developed. Cardiovascular:      Rate and Rhythm: Normal rate and regular rhythm. Pulses:           Dorsalis pedis pulses are 2+ on the right side and 2+ on the left side. Posterior tibial pulses are 2+ on the right side and 2+ on the left side. Heart sounds: No murmur heard. No friction rub. No gallop. Comments: No Lower Extremity Edema  Pulmonary:      Effort: Pulmonary effort is normal.      Breath sounds: Normal breath sounds. No wheezing or rales. Abdominal:      General: Bowel sounds are normal. There is no distension. Palpations: Abdomen is soft. There is no mass. Tenderness: There is no abdominal tenderness. Skin:     General: Skin is warm and dry. Findings: No rash.          Chemistry        Component Value Date/Time     06/13/2022 1155    K 4.3 06/13/2022 1155     06/13/2022 1155    CO2 25 06/13/2022 1155    BUN 15 06/13/2022 1155    CREATININE 0.7 (L) 06/13/2022 1155        Component Value Date/Time    CALCIUM 9.7 06/13/2022 1155    ALKPHOS 103 06/13/2022 1155    AST 34 06/13/2022 1155    ALT 40 06/13/2022 1155    BILITOT 0.5 06/13/2022 1155          Lab Results   Component Value Date    WBC 9.6 07/29/2016    HGB 14.5 07/29/2016    HCT 42.6 07/29/2016    MCV 93.6 07/29/2016     07/29/2016     Lab Results   Component Value Date    LABA1C 9.1 12/19/2022     Lab Results   Component Value Date    .6 12/15/2020     Lab Results   Component Value Date    LABA1C 9.1 12/19/2022     No components found for: CHLPL  Lab Results   Component Value Date    TRIG 136 06/13/2022    TRIG 330 (H) 06/15/2021    TRIG 233 (H) 06/08/2020     Lab Results   Component Value Date    HDL 49 06/13/2022    HDL 40 06/15/2021    HDL 46 06/08/2020     Lab Results   Component Value Date    LDLCALC 89 06/13/2022    LDLCALC see below 06/15/2021    LDLCALC 49 06/08/2020     Lab Results   Component Value Date    LABVLDL 27 06/13/2022    LABVLDL see below 06/15/2021    LABVLDL 47 06/08/2020         Assessment   Plan   1. Type 2 diabetes mellitus without complication, without long-term current use of insulin (HCC)  Uncontrolled today with a hemoglobin A1c of 9.1. We will increase his medication by adding in glipizide 5 mg daily and counseled patient to take his metformin twice a day. Follow-up in 6 weeks  - Comprehensive Metabolic Panel; Future  - POCT glycosylated hemoglobin (Hb A1C)  - Microalbumin / Creatinine Urine Ratio; Future  - glipiZIDE (GLUCOTROL XL) 5 MG extended release tablet; Take 1 tablet by mouth daily  Dispense: 90 tablet; Refill: 3    2. Hypercholesterolemia  Controlled: Appears stable. We will continue current management and monitor for adverse reaction and disease progression. Follow-up as noted below    - Comprehensive Metabolic Panel; Future    3. Anxiety  Controlled: Appears stable. We will continue current management and monitor for adverse reaction and disease progression. Follow-up as noted below      4. Primary hypertension  Controlled: Appears stable. We will continue current management and monitor for adverse reaction and disease progression. Follow-up as noted below    - Comprehensive Metabolic Panel; Future    5.  Screen for colon cancer  Discussed options for colon cancer screening. Patient elects Cologuard  - Cologuard (Fecal DNA Colorectal Cancer Screening)        RTC Return in about 6 weeks (around 1/30/2023).

## 2023-02-06 ENCOUNTER — OFFICE VISIT (OUTPATIENT)
Dept: PRIMARY CARE CLINIC | Age: 53
End: 2023-02-06
Payer: COMMERCIAL

## 2023-02-06 VITALS
HEART RATE: 70 BPM | WEIGHT: 251.6 LBS | DIASTOLIC BLOOD PRESSURE: 81 MMHG | SYSTOLIC BLOOD PRESSURE: 132 MMHG | BODY MASS INDEX: 37.15 KG/M2

## 2023-02-06 DIAGNOSIS — E11.9 TYPE 2 DIABETES MELLITUS WITHOUT COMPLICATION, WITHOUT LONG-TERM CURRENT USE OF INSULIN (HCC): Primary | ICD-10-CM

## 2023-02-06 DIAGNOSIS — E66.01 MORBIDLY OBESE (HCC): ICD-10-CM

## 2023-02-06 DIAGNOSIS — I10 PRIMARY HYPERTENSION: ICD-10-CM

## 2023-02-06 PROCEDURE — 99214 OFFICE O/P EST MOD 30 MIN: CPT | Performed by: FAMILY MEDICINE

## 2023-02-06 PROCEDURE — G8427 DOCREV CUR MEDS BY ELIG CLIN: HCPCS | Performed by: FAMILY MEDICINE

## 2023-02-06 PROCEDURE — 3079F DIAST BP 80-89 MM HG: CPT | Performed by: FAMILY MEDICINE

## 2023-02-06 PROCEDURE — G8417 CALC BMI ABV UP PARAM F/U: HCPCS | Performed by: FAMILY MEDICINE

## 2023-02-06 PROCEDURE — 2022F DILAT RTA XM EVC RTNOPTHY: CPT | Performed by: FAMILY MEDICINE

## 2023-02-06 PROCEDURE — 3017F COLORECTAL CA SCREEN DOC REV: CPT | Performed by: FAMILY MEDICINE

## 2023-02-06 PROCEDURE — 3046F HEMOGLOBIN A1C LEVEL >9.0%: CPT | Performed by: FAMILY MEDICINE

## 2023-02-06 PROCEDURE — G8482 FLU IMMUNIZE ORDER/ADMIN: HCPCS | Performed by: FAMILY MEDICINE

## 2023-02-06 PROCEDURE — 3075F SYST BP GE 130 - 139MM HG: CPT | Performed by: FAMILY MEDICINE

## 2023-02-06 PROCEDURE — 1036F TOBACCO NON-USER: CPT | Performed by: FAMILY MEDICINE

## 2023-02-06 PROCEDURE — 83036 HEMOGLOBIN GLYCOSYLATED A1C: CPT | Performed by: FAMILY MEDICINE

## 2023-02-06 RX ORDER — TIRZEPATIDE 5 MG/.5ML
5 INJECTION, SOLUTION SUBCUTANEOUS WEEKLY
Qty: 2 ML | Refills: 0 | Status: SHIPPED | OUTPATIENT
Start: 2023-02-06

## 2023-02-06 RX ORDER — TIRZEPATIDE 7.5 MG/.5ML
7.5 INJECTION, SOLUTION SUBCUTANEOUS WEEKLY
Qty: 2 ML | Refills: 0 | Status: SHIPPED | OUTPATIENT
Start: 2023-02-06

## 2023-02-06 ASSESSMENT — PATIENT HEALTH QUESTIONNAIRE - PHQ9
SUM OF ALL RESPONSES TO PHQ9 QUESTIONS 1 & 2: 0
SUM OF ALL RESPONSES TO PHQ QUESTIONS 1-9: 0
1. LITTLE INTEREST OR PLEASURE IN DOING THINGS: 0
SUM OF ALL RESPONSES TO PHQ QUESTIONS 1-9: 0
2. FEELING DOWN, DEPRESSED OR HOPELESS: 0

## 2023-02-06 NOTE — PROGRESS NOTES
Chief Complaint   Patient presents with    Diabetes    Hypertension       HPI: MELVA  presents for evaluation and management of diabetes, hypertension and cholesterol. Verona Benitez notes that he has been taking his medications as prescribed. States he thought the glipizide was making him use the bathroom more frequently. He does not note decreased urination since starting it. He is taking and tolerating his blood pressure medication without lightheadedness or dizziness. He is taking his Lipitor without abdominal pain or myalgia.         Today's PHQ:    PHQ Scores 2/6/2023 12/19/2022 6/13/2022 3/15/2021 12/15/2020 6/8/2020 7/15/2019   PHQ2 Score 0 0 0 0 0 0 0   PHQ9 Score 0 0 2 0 1 0 0     Interpretation of Total Score Depression Severity: 1-4 = Minimal depression, 5-9 = Mild depression, 10-14 = Moderate depression, 15-19 = Moderately severe depression, 20-27 = Severe depression        Review of Systems    No Known Allergies  New Prescriptions    TIRZEPATIDE (MOUNJARO) 2.5 MG/0.5ML SOPN SC INJECTION    Inject 0.5 mLs into the skin once a week    TIRZEPATIDE (MOUNJARO) 5 MG/0.5ML SOPN SC INJECTION    Inject 0.5 mLs into the skin once a week    TIRZEPATIDE (MOUNJARO) 7.5 MG/0.5ML SOPN SC INJECTION    Inject 0.5 mLs into the skin once a week     Current Outpatient Medications   Medication Sig Dispense Refill    Tirzepatide (MOUNJARO) 7.5 MG/0.5ML SOPN SC injection Inject 0.5 mLs into the skin once a week 2 mL 0    Tirzepatide (MOUNJARO) 5 MG/0.5ML SOPN SC injection Inject 0.5 mLs into the skin once a week 2 mL 0    Tirzepatide (MOUNJARO) 2.5 MG/0.5ML SOPN SC injection Inject 0.5 mLs into the skin once a week 2 mL 0    glipiZIDE (GLUCOTROL XL) 5 MG extended release tablet Take 1 tablet by mouth daily 90 tablet 3    metFORMIN (GLUCOPHAGE) 1000 MG tablet Take 1 tablet by mouth 2 times daily (with meals) 180 tablet 3    hydroCHLOROthiazide (MICROZIDE) 12.5 MG capsule TAKE ONE CAPSULE BY MOUTH EVERY MORNING 90 capsule 3 atorvastatin (LIPITOR) 40 MG tablet TAKE ONE TABLET BY MOUTH ONCE NIGHTLY 90 tablet 3    PARoxetine (PAXIL) 20 MG tablet TAKE ONE TABLET BY MOUTH DAILY 90 tablet 3    metoprolol succinate (TOPROL XL) 25 MG extended release tablet Take 1 tablet by mouth daily 90 tablet 3    empagliflozin (JARDIANCE) 25 MG tablet Take 25 mg by mouth every morning (before breakfast) 90 tablet 3     No current facility-administered medications for this visit. Past Medical History:   Diagnosis Date    Anxiety 3/3/2017    Hypercholesterolemia 1/4/2019    Hypertension     Hypertriglyceridemia 6/8/2018    Type 2 diabetes mellitus without complication ([facility] 6/47/6371         Objective   /81   Pulse 70   Wt 251 lb 9.6 oz (114.1 kg)   BMI 37.15 kg/m²   Wt Readings from Last 3 Encounters:   02/06/23 251 lb 9.6 oz (114.1 kg)   12/19/22 243 lb 3.2 oz (110.3 kg)   09/19/22 242 lb 12.8 oz (110.1 kg)       Physical Exam  Constitutional:       Appearance: He is well-developed. Cardiovascular:      Rate and Rhythm: Normal rate and regular rhythm. Heart sounds: No murmur heard. No friction rub. No gallop. Pulmonary:      Effort: Pulmonary effort is normal.      Breath sounds: Normal breath sounds. No wheezing or rales. Abdominal:      General: Bowel sounds are normal. There is no distension. Palpations: Abdomen is soft. There is no mass. Tenderness: There is no abdominal tenderness. Skin:     General: Skin is warm and dry. Findings: No rash. Neurological:      Comments: Foot Exam: Skin warm, dry and intact w/o callus or erythema.   Sensation intact to 10gm monofilament           Chemistry        Component Value Date/Time     12/19/2022 1126    K 4.5 12/19/2022 1126     12/19/2022 1126    CO2 27 12/19/2022 1126    BUN 17 12/19/2022 1126    CREATININE 0.6 (L) 12/19/2022 1126        Component Value Date/Time    CALCIUM 9.8 12/19/2022 1126    ALKPHOS 120 12/19/2022 1126    AST 32 12/19/2022 1126 ALT 59 (H) 12/19/2022 1126    BILITOT 0.4 12/19/2022 1126          Lab Results   Component Value Date    WBC 9.6 07/29/2016    HGB 14.5 07/29/2016    HCT 42.6 07/29/2016    MCV 93.6 07/29/2016     07/29/2016     Lab Results   Component Value Date    LABA1C 9.1 12/19/2022     Lab Results   Component Value Date    .6 12/15/2020     Lab Results   Component Value Date    LABA1C 9.1 12/19/2022     No components found for: CHLPL  Lab Results   Component Value Date    TRIG 136 06/13/2022    TRIG 330 (H) 06/15/2021    TRIG 233 (H) 06/08/2020     Lab Results   Component Value Date    HDL 49 06/13/2022    HDL 40 06/15/2021    HDL 46 06/08/2020     Lab Results   Component Value Date    LDLCALC 89 06/13/2022    LDLCALC see below 06/15/2021    LDLCALC 49 06/08/2020     Lab Results   Component Value Date    LABVLDL 27 06/13/2022    LABVLDL see below 06/15/2021    LABVLDL 47 06/08/2020         Assessment   Plan   1. Type 2 diabetes mellitus without complication, without long-term current use of insulin (HCC)  Hemoglobin A1c was actually worse today at 9.3. Patient is failing therapy with glipizide. We will add Mounjaro and follow-up in 6 weeks. - POCT glycosylated hemoglobin (Hb A1C)  -  DIABETES FOOT EXAM  - Tirzepatide (MOUNJARO) 7.5 MG/0.5ML SOPN SC injection; Inject 0.5 mLs into the skin once a week  Dispense: 2 mL; Refill: 0  - Tirzepatide (MOUNJARO) 5 MG/0.5ML SOPN SC injection; Inject 0.5 mLs into the skin once a week  Dispense: 2 mL; Refill: 0  - Tirzepatide (MOUNJARO) 2.5 MG/0.5ML SOPN SC injection; Inject 0.5 mLs into the skin once a week  Dispense: 2 mL; Refill: 0    2. Primary hypertension  Controlled: Appears stable. We will continue current management and monitor for adverse reaction and disease progression. Follow-up as noted below      3. Morbidly obese ([address]  Patient has gained 8 pounds since last visit. Patient was given Cologuard prescription on December 19, 2022.   We are awaiting him to sign this and        RTC Return in about 6 weeks (around 3/20/2023).

## 2023-02-17 ENCOUNTER — TELEPHONE (OUTPATIENT)
Dept: PRIMARY CARE CLINIC | Age: 53
End: 2023-02-17

## 2023-02-17 NOTE — TELEPHONE ENCOUNTER
Good Afternoon,     My name is Tyler Gabriel am the Nurse here at [facility]. So we can keep giving our best we just wanted to remind you to make sure to schedule your Colonoscopy this year and if you need help let me know. I can refer you to GI Doctor or Order the Cologuard Kit to your home. If you prefer the Cologuard kit, if you could follow the instructions and send back, that will help us in continuing with your best care. Have A [employer] Year!         Everette Abreu LPN  [facility]  [phone] # 1

## 2023-03-20 ENCOUNTER — OFFICE VISIT (OUTPATIENT)
Dept: PRIMARY CARE CLINIC | Age: 53
End: 2023-03-20
Payer: COMMERCIAL

## 2023-03-20 VITALS
BODY MASS INDEX: 36.85 KG/M2 | DIASTOLIC BLOOD PRESSURE: 82 MMHG | WEIGHT: 248.8 LBS | HEART RATE: 66 BPM | SYSTOLIC BLOOD PRESSURE: 137 MMHG | TEMPERATURE: 97.2 F | HEIGHT: 69 IN

## 2023-03-20 DIAGNOSIS — Z12.11 SCREEN FOR COLON CANCER: ICD-10-CM

## 2023-03-20 DIAGNOSIS — E11.9 TYPE 2 DIABETES MELLITUS WITHOUT COMPLICATION, WITHOUT LONG-TERM CURRENT USE OF INSULIN (HCC): Primary | ICD-10-CM

## 2023-03-20 DIAGNOSIS — I10 PRIMARY HYPERTENSION: ICD-10-CM

## 2023-03-20 DIAGNOSIS — E66.01 MORBIDLY OBESE (HCC): ICD-10-CM

## 2023-03-20 LAB — HBA1C MFR BLD: 7.8 %

## 2023-03-20 PROCEDURE — 99214 OFFICE O/P EST MOD 30 MIN: CPT | Performed by: FAMILY MEDICINE

## 2023-03-20 PROCEDURE — 3017F COLORECTAL CA SCREEN DOC REV: CPT | Performed by: FAMILY MEDICINE

## 2023-03-20 PROCEDURE — 83036 HEMOGLOBIN GLYCOSYLATED A1C: CPT | Performed by: FAMILY MEDICINE

## 2023-03-20 PROCEDURE — G8417 CALC BMI ABV UP PARAM F/U: HCPCS | Performed by: FAMILY MEDICINE

## 2023-03-20 PROCEDURE — 2022F DILAT RTA XM EVC RTNOPTHY: CPT | Performed by: FAMILY MEDICINE

## 2023-03-20 PROCEDURE — G8482 FLU IMMUNIZE ORDER/ADMIN: HCPCS | Performed by: FAMILY MEDICINE

## 2023-03-20 PROCEDURE — 3075F SYST BP GE 130 - 139MM HG: CPT | Performed by: FAMILY MEDICINE

## 2023-03-20 PROCEDURE — 3079F DIAST BP 80-89 MM HG: CPT | Performed by: FAMILY MEDICINE

## 2023-03-20 PROCEDURE — G8427 DOCREV CUR MEDS BY ELIG CLIN: HCPCS | Performed by: FAMILY MEDICINE

## 2023-03-20 PROCEDURE — 1036F TOBACCO NON-USER: CPT | Performed by: FAMILY MEDICINE

## 2023-03-20 PROCEDURE — 3051F HG A1C>EQUAL 7.0%<8.0%: CPT | Performed by: FAMILY MEDICINE

## 2023-03-20 SDOH — ECONOMIC STABILITY: HOUSING INSECURITY
IN THE LAST 12 MONTHS, WAS THERE A TIME WHEN YOU DID NOT HAVE A STEADY PLACE TO SLEEP OR SLEPT IN A SHELTER (INCLUDING NOW)?: NO

## 2023-03-20 SDOH — ECONOMIC STABILITY: INCOME INSECURITY: HOW HARD IS IT FOR YOU TO PAY FOR THE VERY BASICS LIKE FOOD, HOUSING, MEDICAL CARE, AND HEATING?: NOT HARD AT ALL

## 2023-03-20 SDOH — ECONOMIC STABILITY: FOOD INSECURITY: WITHIN THE PAST 12 MONTHS, THE FOOD YOU BOUGHT JUST DIDN'T LAST AND YOU DIDN'T HAVE MONEY TO GET MORE.: NEVER TRUE

## 2023-03-20 SDOH — ECONOMIC STABILITY: FOOD INSECURITY: WITHIN THE PAST 12 MONTHS, YOU WORRIED THAT YOUR FOOD WOULD RUN OUT BEFORE YOU GOT MONEY TO BUY MORE.: NEVER TRUE

## 2023-03-20 NOTE — Clinical Note
Good emilia,  he will need an appointment in 6 months (I will see him in 3 months) Thanks, Bebeto Rajput

## 2023-03-20 NOTE — PROGRESS NOTES
Scheduled for 9/25/23
36.74 kg/m²   Wt Readings from Last 3 Encounters:   03/20/23 248 lb 12.8 oz (112.9 kg)   02/06/23 251 lb 9.6 oz (114.1 kg)   12/19/22 243 lb 3.2 oz (110.3 kg)       Physical Exam  Constitutional:       Appearance: He is well-developed. Cardiovascular:      Rate and Rhythm: Normal rate and regular rhythm. Heart sounds: No murmur heard. No friction rub. No gallop. Pulmonary:      Effort: Pulmonary effort is normal.      Breath sounds: Normal breath sounds. No wheezing or rales. Abdominal:      General: Bowel sounds are normal. There is no distension. Palpations: Abdomen is soft. There is no mass. Tenderness: There is no abdominal tenderness. Skin:     General: Skin is warm and dry. Findings: No rash.          Chemistry        Component Value Date/Time     12/19/2022 1126    K 4.5 12/19/2022 1126     12/19/2022 1126    CO2 27 12/19/2022 1126    BUN 17 12/19/2022 1126    CREATININE 0.6 (L) 12/19/2022 1126        Component Value Date/Time    CALCIUM 9.8 12/19/2022 1126    ALKPHOS 120 12/19/2022 1126    AST 32 12/19/2022 1126    ALT 59 (H) 12/19/2022 1126    BILITOT 0.4 12/19/2022 1126          Lab Results   Component Value Date    WBC 9.6 07/29/2016    HGB 14.5 07/29/2016    HCT 42.6 07/29/2016    MCV 93.6 07/29/2016     07/29/2016     Lab Results   Component Value Date    LABA1C 7.8 03/20/2023     Lab Results   Component Value Date    .6 12/15/2020     Lab Results   Component Value Date    LABA1C 7.8 03/20/2023     No components found for: CHLPL  Lab Results   Component Value Date    TRIG 136 06/13/2022    TRIG 330 (H) 06/15/2021    TRIG 233 (H) 06/08/2020     Lab Results   Component Value Date    HDL 49 06/13/2022    HDL 40 06/15/2021    HDL 46 06/08/2020     Lab Results   Component Value Date    LDLCALC 89 06/13/2022    LDLCALC see below 06/15/2021    LDLCALC 49 06/08/2020     Lab Results   Component Value Date    LABVLDL 27 06/13/2022    LABVLDL see below

## 2023-06-18 DIAGNOSIS — I10 ESSENTIAL HYPERTENSION: ICD-10-CM

## 2023-06-18 DIAGNOSIS — F41.9 ANXIETY: ICD-10-CM

## 2023-06-19 ENCOUNTER — OFFICE VISIT (OUTPATIENT)
Dept: PRIMARY CARE CLINIC | Age: 53
End: 2023-06-19
Payer: COMMERCIAL

## 2023-06-19 VITALS
DIASTOLIC BLOOD PRESSURE: 88 MMHG | SYSTOLIC BLOOD PRESSURE: 139 MMHG | BODY MASS INDEX: 37.03 KG/M2 | TEMPERATURE: 96.8 F | HEART RATE: 82 BPM | WEIGHT: 250 LBS | HEIGHT: 69 IN | OXYGEN SATURATION: 96 %

## 2023-06-19 DIAGNOSIS — I10 PRIMARY HYPERTENSION: Primary | ICD-10-CM

## 2023-06-19 DIAGNOSIS — E11.9 TYPE 2 DIABETES MELLITUS WITHOUT COMPLICATION, WITHOUT LONG-TERM CURRENT USE OF INSULIN (HCC): ICD-10-CM

## 2023-06-19 DIAGNOSIS — I10 PRIMARY HYPERTENSION: ICD-10-CM

## 2023-06-19 DIAGNOSIS — Z12.11 SCREEN FOR COLON CANCER: ICD-10-CM

## 2023-06-19 PROCEDURE — 2022F DILAT RTA XM EVC RTNOPTHY: CPT | Performed by: FAMILY MEDICINE

## 2023-06-19 PROCEDURE — 3051F HG A1C>EQUAL 7.0%<8.0%: CPT | Performed by: FAMILY MEDICINE

## 2023-06-19 PROCEDURE — 3079F DIAST BP 80-89 MM HG: CPT | Performed by: FAMILY MEDICINE

## 2023-06-19 PROCEDURE — 99214 OFFICE O/P EST MOD 30 MIN: CPT | Performed by: FAMILY MEDICINE

## 2023-06-19 PROCEDURE — G8417 CALC BMI ABV UP PARAM F/U: HCPCS | Performed by: FAMILY MEDICINE

## 2023-06-19 PROCEDURE — 3075F SYST BP GE 130 - 139MM HG: CPT | Performed by: FAMILY MEDICINE

## 2023-06-19 PROCEDURE — 1036F TOBACCO NON-USER: CPT | Performed by: FAMILY MEDICINE

## 2023-06-19 PROCEDURE — G8427 DOCREV CUR MEDS BY ELIG CLIN: HCPCS | Performed by: FAMILY MEDICINE

## 2023-06-19 PROCEDURE — 3017F COLORECTAL CA SCREEN DOC REV: CPT | Performed by: FAMILY MEDICINE

## 2023-06-19 RX ORDER — PAROXETINE HYDROCHLORIDE 20 MG/1
TABLET, FILM COATED ORAL
Qty: 90 TABLET | Refills: 3 | Status: SHIPPED | OUTPATIENT
Start: 2023-06-19

## 2023-06-19 RX ORDER — METOPROLOL SUCCINATE 25 MG/1
TABLET, EXTENDED RELEASE ORAL
Qty: 90 TABLET | Refills: 3 | Status: SHIPPED | OUTPATIENT
Start: 2023-06-19

## 2023-06-19 NOTE — PROGRESS NOTES
Chief Complaint   Patient presents with    Check-Up     3 month check up       HPI: MELVA  presents for evaluation and management of diabetes hypertension and colon cancer screening. He notes he is taking and tolerating his diabetic medications. He had been trying to work on lifestyle interventions to lower his A1c but notes that his work is very demanding and he is working 12 hours a day 6 days a week and really struggles to make significant changes. He is taking and tolerating his blood pressure medicine without lightheadedness or dizziness           Review of Systems    No Known Allergies  New Prescriptions    No medications on file     Current Outpatient Medications   Medication Sig Dispense Refill    glipiZIDE (GLUCOTROL XL) 5 MG extended release tablet Take 1 tablet by mouth daily 90 tablet 3    metFORMIN (GLUCOPHAGE) 1000 MG tablet Take 1 tablet by mouth 2 times daily (with meals) 180 tablet 3    hydroCHLOROthiazide (MICROZIDE) 12.5 MG capsule TAKE ONE CAPSULE BY MOUTH EVERY MORNING 90 capsule 3    atorvastatin (LIPITOR) 40 MG tablet TAKE ONE TABLET BY MOUTH ONCE NIGHTLY 90 tablet 3    PARoxetine (PAXIL) 20 MG tablet TAKE ONE TABLET BY MOUTH DAILY 90 tablet 3    metoprolol succinate (TOPROL XL) 25 MG extended release tablet Take 1 tablet by mouth daily 90 tablet 3    empagliflozin (JARDIANCE) 25 MG tablet Take 25 mg by mouth every morning (before breakfast) 90 tablet 3     No current facility-administered medications for this visit.        Past Medical History:   Diagnosis Date    Anxiety 3/3/2017    Hypercholesterolemia 1/4/2019    Hypertension     Hypertriglyceridemia 6/8/2018    Type 2 diabetes mellitus without complication ([facility] 0/53/2633         Objective   /88   Pulse 82   Temp 96.8 °F (36 °C) (Temporal)   Ht 5' 9\" (1.753 m)   Wt 250 lb (113.4 kg)   SpO2 96%   BMI 36.92 kg/m²   Wt Readings from Last 3 Encounters:   06/19/23 250 lb (113.4 kg)   03/20/23 248 lb 12.8 oz (112.9 kg)

## 2023-06-19 NOTE — PATIENT INSTRUCTIONS
Rad Hook MD  [address] [place] [address]  [address]   Phone: [phone]  Fax: [phone]    [address] [place] [address], [address] [place] [address] [address]   Phone: [phone]   Fax: [phone]

## 2023-06-19 NOTE — TELEPHONE ENCOUNTER
Medication:   Requested Prescriptions     Pending Prescriptions Disp Refills    metoprolol succinate (TOPROL XL) 25 MG extended release tablet [Pharmacy Med Name: METOPROLOL SUCC ER 25 MG TAB] 90 tablet 3     Sig: TAKE ONE TABLET BY MOUTH DAILY    PARoxetine (PAXIL) 20 MG tablet [Pharmacy Med Name: PARoxetine HCL 20 MG TABLET] 90 tablet 3     Sig: TAKE ONE TABLET BY MOUTH DAILY        Last Filled:  6/13/22    Patient Phone Number: [phone] (home) [phone] (work)    Last appt: 3/20/2023   Next appt: 6/19/2023    Last OARRS: No flowsheet data found.

## 2023-06-20 LAB
ALBUMIN SERPL-MCNC: 4.7 G/DL (ref 3.4–5)
ALBUMIN/GLOB SERPL: 2.1 {RATIO} (ref 1.1–2.2)
ALP SERPL-CCNC: 85 U/L (ref 40–129)
ALT SERPL-CCNC: 39 U/L (ref 10–40)
ANION GAP SERPL CALCULATED.3IONS-SCNC: 11 MMOL/L (ref 3–16)
AST SERPL-CCNC: 27 U/L (ref 15–37)
BILIRUB SERPL-MCNC: 0.4 MG/DL (ref 0–1)
BUN SERPL-MCNC: 20 MG/DL (ref 7–20)
CALCIUM SERPL-MCNC: 9.2 MG/DL (ref 8.3–10.6)
CHLORIDE SERPL-SCNC: 101 MMOL/L (ref 99–110)
CHOLEST SERPL-MCNC: 160 MG/DL (ref 0–199)
CO2 SERPL-SCNC: 28 MMOL/L (ref 21–32)
CREAT SERPL-MCNC: 0.8 MG/DL (ref 0.9–1.3)
EST. AVERAGE GLUCOSE BLD GHB EST-MCNC: 194.4 MG/DL
GFR SERPLBLD CREATININE-BSD FMLA CKD-EPI: >60 ML/MIN/{1.73_M2}
GLUCOSE SERPL-MCNC: 192 MG/DL (ref 70–99)
HBA1C MFR BLD: 8.4 %
HDLC SERPL-MCNC: 40 MG/DL (ref 40–60)
LDLC SERPL CALC-MCNC: 87 MG/DL
POTASSIUM SERPL-SCNC: 4.3 MMOL/L (ref 3.5–5.1)
PROT SERPL-MCNC: 6.9 G/DL (ref 6.4–8.2)
SODIUM SERPL-SCNC: 140 MMOL/L (ref 136–145)
TRIGL SERPL-MCNC: 163 MG/DL (ref 0–150)
VLDLC SERPL CALC-MCNC: 33 MG/DL

## 2023-06-20 NOTE — RESULT ENCOUNTER NOTE
Good Morning Erving Late ,    Thanks for coming in and getting your labs drawn. Your results are back and your diabetes control is not quite as good. Your kidney and liver function remain good and your cholesterol is also controlled. Keep up the good work! 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       Have a great week,    Dr. Deandre Calhoun      For your convenience I have listed your future appointment(s) below:  Future Appointments  9/25/2023  11:00 AM   MD MCKENNA Moreno

## 2023-09-24 PROBLEM — E78.2 MIXED HYPERLIPIDEMIA: Status: ACTIVE | Noted: 2023-09-24
